# Patient Record
Sex: FEMALE | Race: BLACK OR AFRICAN AMERICAN | NOT HISPANIC OR LATINO | Employment: UNEMPLOYED | URBAN - METROPOLITAN AREA
[De-identification: names, ages, dates, MRNs, and addresses within clinical notes are randomized per-mention and may not be internally consistent; named-entity substitution may affect disease eponyms.]

---

## 2019-10-31 ENCOUNTER — RECORDS - HEALTHEAST (OUTPATIENT)
Dept: LAB | Facility: CLINIC | Age: 39
End: 2019-10-31

## 2019-10-31 ENCOUNTER — RECORDS - HEALTHEAST (OUTPATIENT)
Dept: ADMINISTRATIVE | Facility: OTHER | Age: 39
End: 2019-10-31

## 2019-10-31 LAB
ANION GAP SERPL CALCULATED.3IONS-SCNC: 9 MMOL/L (ref 5–18)
BUN SERPL-MCNC: 12 MG/DL (ref 8–22)
CALCIUM SERPL-MCNC: 9 MG/DL (ref 8.5–10.5)
CHLORIDE BLD-SCNC: 106 MMOL/L (ref 98–107)
CHOLEST SERPL-MCNC: 196 MG/DL
CLUE CELLS: NORMAL
CO2 SERPL-SCNC: 24 MMOL/L (ref 22–31)
CREAT SERPL-MCNC: 0.74 MG/DL (ref 0.6–1.1)
FASTING STATUS PATIENT QL REPORTED: ABNORMAL
GFR SERPL CREATININE-BSD FRML MDRD: >60 ML/MIN/1.73M2
GLUCOSE BLD-MCNC: 80 MG/DL (ref 70–125)
HDLC SERPL-MCNC: 36 MG/DL
LDLC SERPL CALC-MCNC: 135 MG/DL
POTASSIUM BLD-SCNC: 4 MMOL/L (ref 3.5–5)
SODIUM SERPL-SCNC: 139 MMOL/L (ref 136–145)
TRICHOMONAS, WET PREP: NORMAL
TRIGL SERPL-MCNC: 127 MG/DL
TSH SERPL DL<=0.005 MIU/L-ACNC: 0.35 UIU/ML (ref 0.3–5)
YEAST, WET PREP: NORMAL

## 2019-11-01 LAB
C TRACH DNA SPEC QL PROBE+SIG AMP: NEGATIVE
HPV SOURCE: NORMAL
HUMAN PAPILLOMA VIRUS 16 DNA: NEGATIVE
HUMAN PAPILLOMA VIRUS 18 DNA: NEGATIVE
HUMAN PAPILLOMA VIRUS FINAL DIAGNOSIS: NORMAL
HUMAN PAPILLOMA VIRUS OTHER HR: NEGATIVE
N GONORRHOEA DNA SPEC QL NAA+PROBE: NEGATIVE
SPECIMEN DESCRIPTION: NORMAL

## 2019-11-06 LAB

## 2020-07-14 ENCOUNTER — RECORDS - HEALTHEAST (OUTPATIENT)
Dept: ADMINISTRATIVE | Facility: OTHER | Age: 40
End: 2020-07-14

## 2020-07-20 ENCOUNTER — RECORDS - HEALTHEAST (OUTPATIENT)
Dept: ADMINISTRATIVE | Facility: OTHER | Age: 40
End: 2020-07-20

## 2020-07-22 ENCOUNTER — COMMUNICATION - HEALTHEAST (OUTPATIENT)
Dept: ADMINISTRATIVE | Facility: CLINIC | Age: 40
End: 2020-07-22

## 2020-07-27 ENCOUNTER — COMMUNICATION - HEALTHEAST (OUTPATIENT)
Dept: RHEUMATOLOGY | Facility: CLINIC | Age: 40
End: 2020-07-27

## 2020-07-27 ENCOUNTER — OFFICE VISIT - HEALTHEAST (OUTPATIENT)
Dept: RHEUMATOLOGY | Facility: CLINIC | Age: 40
End: 2020-07-27

## 2020-07-27 DIAGNOSIS — M25.562 CHRONIC PAIN OF BOTH KNEES: ICD-10-CM

## 2020-07-27 DIAGNOSIS — E66.3 OVERWEIGHT: ICD-10-CM

## 2020-07-27 DIAGNOSIS — M79.18 MYOFASCIAL PAIN: ICD-10-CM

## 2020-07-27 DIAGNOSIS — M79.642 PAIN IN BOTH HANDS: ICD-10-CM

## 2020-07-27 DIAGNOSIS — G89.29 CHRONIC PAIN OF BOTH KNEES: ICD-10-CM

## 2020-07-27 DIAGNOSIS — M25.561 CHRONIC PAIN OF BOTH KNEES: ICD-10-CM

## 2020-07-27 DIAGNOSIS — M79.641 PAIN IN BOTH HANDS: ICD-10-CM

## 2020-07-27 RX ORDER — ALBUTEROL SULFATE 0.83 MG/ML
SOLUTION RESPIRATORY (INHALATION)
Status: SHIPPED | COMMUNITY
Start: 2020-03-17

## 2020-07-27 RX ORDER — CELECOXIB 100 MG/1
100 CAPSULE ORAL 2 TIMES DAILY
Status: SHIPPED | COMMUNITY
Start: 2020-07-06 | End: 2022-06-16

## 2020-07-27 RX ORDER — ALBUTEROL SULFATE 90 UG/1
AEROSOL, METERED RESPIRATORY (INHALATION)
Status: SHIPPED | COMMUNITY
Start: 2020-07-27 | End: 2022-01-06

## 2020-08-07 ENCOUNTER — HOSPITAL ENCOUNTER (OUTPATIENT)
Dept: RADIOLOGY | Facility: HOSPITAL | Age: 40
Discharge: HOME | End: 2020-08-07
Attending: INTERNAL MEDICINE

## 2020-08-07 DIAGNOSIS — M25.561 CHRONIC PAIN OF BOTH KNEES: ICD-10-CM

## 2020-08-07 DIAGNOSIS — M79.641 PAIN IN BOTH HANDS: ICD-10-CM

## 2020-08-07 DIAGNOSIS — G89.29 CHRONIC PAIN OF BOTH KNEES: ICD-10-CM

## 2020-08-07 DIAGNOSIS — M25.562 CHRONIC PAIN OF BOTH KNEES: ICD-10-CM

## 2020-08-07 DIAGNOSIS — M79.642 PAIN IN BOTH HANDS: ICD-10-CM

## 2020-08-11 ENCOUNTER — COMMUNICATION - HEALTHEAST (OUTPATIENT)
Dept: RHEUMATOLOGY | Facility: CLINIC | Age: 40
End: 2020-08-11

## 2020-09-01 ENCOUNTER — OFFICE VISIT (OUTPATIENT)
Dept: NEUROLOGY | Facility: CLINIC | Age: 40
End: 2020-09-01
Payer: COMMERCIAL

## 2020-09-01 DIAGNOSIS — M79.641 PAIN IN BOTH HANDS: Primary | ICD-10-CM

## 2020-09-01 DIAGNOSIS — M79.642 PAIN IN BOTH HANDS: Primary | ICD-10-CM

## 2020-09-01 DIAGNOSIS — G56.02 CARPAL TUNNEL SYNDROME OF LEFT WRIST: ICD-10-CM

## 2020-09-01 PROCEDURE — 95910 NRV CNDJ TEST 7-8 STUDIES: CPT | Performed by: PSYCHIATRY & NEUROLOGY

## 2020-09-01 PROCEDURE — 95886 MUSC TEST DONE W/N TEST COMP: CPT | Mod: RT | Performed by: PSYCHIATRY & NEUROLOGY

## 2020-09-01 NOTE — LETTER
9/1/2020         RE: Jannie Sanchez  [address]  [facility] [address]  [facility] [address]        Dear Colleague,    Thank you for referring your patient, Jannie Sanchez, to the [facility] [place]. Please see a copy of my visit note below.    EMG performed.  Margarito Narayanan MD    Again, thank you for allowing me to participate in the care of your patient.        Sincerely,        Margarito Narayanan MD, MD

## 2020-09-01 NOTE — PROCEDURES
[facility]    [facility]Former[facility], [address]  [place] [address]  Tel: [phone]  Fax: [phone]          Full Name: Jannie Sanchez Gender: Female  Patient ID: [id number] YOB: 1980      Visit Date: 9/1/2020 11:01  Age: 39 Years 9 Months Old  Interpreted By: Margarito Narayanan MD   Ref Dr.: Kayli Rod PA  Tech:    EDX Exam: EMG   Height: 5 feet 6 inch  Reason for referral: Evaluate bilateral uppers. c/o weakness in both hands > 2 years. Right > Left.       Motor NCS      Nerve / Sites Lat Amp Dist Enrico    ms mV cm m/s   R Median - APB      Wrist 3.33 18.4 7       Elbow 7.40 17.9 24 59   L Median - APB      Wrist 3.70 11.6 7       Elbow 7.66 11.1 22 56   R Ulnar - ADM      Wrist 2.71 9.6 7       B.Elbow 6.30 9.7 23 64      A.Elbow 7.76 9.7 10 69       F  Wave      Nerve Fmin    ms   R Ulnar - ADM 25.99       Sensory NCS      Nerve / Sites Onset Lat Pk Lat PP Amp Dist    ms ms  V cm   R Median - II (Antidr)      Wrist 2.60 3.13 54.5 13   L Median - II (Antidr)      Wrist 2.81 3.39 57.7 13   R Ulnar - V (Antidr)      Wrist 1.98 2.60 48.5 11   R Median, Ulnar - Transcarpal comparison      Median Palm 1.67 2.24 33.0 8      Ulnar Palm 1.35 1.93 33.4 8   L Median, Ulnar - Transcarpal comparison      Median Palm 1.77 2.40 55.3 8      Ulnar Palm 1.25 1.82 38.2 8       EMG Summary Table     Spontaneous MUAP Rcmt Note   Muscle Fib PSW Fasc IA # Amp Dur PPP Rate Type   R. Brachioradialis None None None N N N N N N N   R. Pronator teres None None None N N N N N N N   R. Biceps brachii None None None N N N N N N N   R. Deltoid None None None N N N N N N N   R. Triceps brachii None None None N N N N N N N   R. Flexor carpi ulnaris None None None N N N N N N N   R. First dorsal interosseous None None None N N N N N N N   R. Abductor pollicis brevis None None None N N N N N N N   L. Brachioradialis None None None N N N N N N N    L. Pronator teres None None None N N N N N N N   L. Biceps brachii None None None N N N N N N N   L. Deltoid None None None N N N N N N N   L. Triceps brachii None None None N N N N N N N   L. First dorsal interosseous None None None N N N N N N N   L. Abductor pollicis brevis None None None N N N N N N N     EMG report  Patient name: Jannie Sanchez  YOB: 1980  Date of service: 9/1/2020  Medical record number: [id number]  Referring provider: Kayli Rod PA-C  EMG interpreted by: Margarito Narayanan MD    REASON FOR STUDY: 39-year-old female with pain and weakness of both hands for more than 2 years time,  worse on the right as compared to the left.    SENSORY NCS: Bilateral median and right ulnar sensory nerve conduction studies demonstrate normal distal latencies and amplitudes.  Left median-ulnar palmar study demonstrates prolongation of the left median palmar response as compared to the left ulnar palmar response.  Right median-ulnar palmar study demonstrates no significant latency difference.    MOTOR NCS: Bilateral median and right ulnar motor nerve conduction studies demonstrate normal distal latencies, amplitudes and nerve conduction velocities.  Right ulnar F wave is within normal limits.    NEEDLE EXAMINATION: Needle examination of selected muscles of both upper extremities was performed.  No spontaneous activity was seen.  Motor units were of normal amplitude, duration and recruitment.    IMPRESSION: 1.  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    Margarito Narayanan MD

## 2020-09-01 NOTE — LETTER
Pre-procedure Diagnoses    1. Pain in both hands [M79.641, M79.642]    Post-procedure Diagnoses    1. Pain in both hands [M79.641, M79.642]    2. Carpal tunnel syndrome of left wrist [G56.02]    Procedures    1. EMG [NEU5 (Custom)]           [facility]     ([facility]) [facility] [address], [address]  [place] [address]  Tel: [phone]  Fax: [phone]            Full Name:    Jannie Sanchez                          Gender:             Female  Patient ID:    [id number]                                         YOB: 1980      Visit Date:                      9/1/2020 11:01  Age:                               39 Years 9 Months Old  Interpreted By:              Margarito Narayanan MD   Ref Dr.:                          Kayli SMITH  Tech:                              ST   EDX Exam:                    EMG   Height:                           5 feet 6 inch  Reason for referral:      Evaluate bilateral uppers. c/o weakness in both hands > 2 years. Right > Left.       Motor NCS      Nerve / Sites Lat Amp Dist Enrico     ms mV cm m/s   R Median - APB      Wrist 3.33 18.4 7        Elbow 7.40 17.9 24 59   L Median - APB      Wrist 3.70 11.6 7        Elbow 7.66 11.1 22 56   R Ulnar - ADM      Wrist 2.71 9.6 7        B.Elbow 6.30 9.7 23 64      A.Elbow 7.76 9.7 10 69       F  Wave      Nerve Fmin     ms   R Ulnar - ADM 25.99       Sensory NCS      Nerve / Sites Onset Lat Pk Lat PP Amp Dist     ms ms  V cm   R Median - II (Antidr)      Wrist 2.60 3.13 54.5 13   L Median - II (Antidr)      Wrist 2.81 3.39 57.7 13   R Ulnar - V (Antidr)      Wrist 1.98 2.60 48.5 11   R Median, Ulnar - Transcarpal comparison      Median Palm 1.67 2.24 33.0 8      Ulnar Palm 1.35 1.93 33.4 8   L Median, Ulnar - Transcarpal comparison      Median Palm 1.77 2.40 55.3 8      Ulnar Palm 1.25 1.82 38.2 8                    EMG Summary Table       Spontaneous MUAP Rcmt  Note   Muscle Fib PSW Fasc IA # Amp Dur PPP Rate Type   R. Brachioradialis None None None N N N N N N N   R. Pronator teres None None None N N N N N N N   R. Biceps brachii None None None N N N N N N N   R. Deltoid None None None N N N N N N N   R. Triceps brachii None None None N N N N N N N   R. Flexor carpi ulnaris None None None N N N N N N N   R. First dorsal interosseous None None None N N N N N N N   R. Abductor pollicis brevis None None None N N N N N N N   L. Brachioradialis None None None N N N N N N N   L. Pronator teres None None None N N N N N N N   L. Biceps brachii None None None N N N N N N N   L. Deltoid None None None N N N N N N N   L. Triceps brachii None None None N N N N N N N   L. First dorsal interosseous None None None N N N N N N N   L. Abductor pollicis brevis None None None N N N N N N N      EMG report  Patient name: Jannie Sanchez  YOB: 1980  Date of service: 9/1/2020  Medical record number: [id number]  Referring provider: Kayli Rod PA-C  EMG interpreted by: Margarito Narayanan MD     REASON FOR STUDY: 39-year-old female with pain and weakness of both hands for more than 2 years time,  worse on the right as compared to the left.     SENSORY NCS: Bilateral median and right ulnar sensory nerve conduction studies demonstrate normal distal latencies and amplitudes.  Left median-ulnar palmar study demonstrates prolongation of the left median palmar response as compared to the left ulnar palmar response.  Right median-ulnar palmar study demonstrates no significant latency difference.     MOTOR NCS: Bilateral median and right ulnar motor nerve conduction studies demonstrate normal distal latencies, amplitudes and nerve conduction velocities.  Right ulnar F wave is within normal limits.     NEEDLE EXAMINATION: Needle examination of selected muscles of both upper extremities was performed.  No spontaneous activity was seen.  Motor units were of normal amplitude, duration  and recruitment.     IMPRESSION: 1.  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     Margarito Narayanan MD

## 2021-02-01 ENCOUNTER — OFFICE VISIT - HEALTHEAST (OUTPATIENT)
Dept: RHEUMATOLOGY | Facility: CLINIC | Age: 41
End: 2021-02-01

## 2021-02-01 ENCOUNTER — COMMUNICATION - HEALTHEAST (OUTPATIENT)
Dept: RHEUMATOLOGY | Facility: CLINIC | Age: 41
End: 2021-02-01

## 2021-02-01 DIAGNOSIS — M54.2 NECK PAIN: ICD-10-CM

## 2021-02-01 DIAGNOSIS — G89.29 CHRONIC PAIN OF BOTH KNEES: ICD-10-CM

## 2021-02-01 DIAGNOSIS — E83.51 LOW CALCIUM LEVELS: ICD-10-CM

## 2021-02-01 DIAGNOSIS — M25.561 CHRONIC PAIN OF BOTH KNEES: ICD-10-CM

## 2021-02-01 DIAGNOSIS — M79.18 MYOFASCIAL PAIN: ICD-10-CM

## 2021-02-01 DIAGNOSIS — E66.01 MORBID OBESITY (H): ICD-10-CM

## 2021-02-01 DIAGNOSIS — M79.642 PAIN IN BOTH HANDS: ICD-10-CM

## 2021-02-01 DIAGNOSIS — M25.562 CHRONIC PAIN OF BOTH KNEES: ICD-10-CM

## 2021-02-01 DIAGNOSIS — M79.641 PAIN IN BOTH HANDS: ICD-10-CM

## 2021-02-01 RX ORDER — MOMETASONE FUROATE AND FORMOTEROL FUMARATE DIHYDRATE 100; 5 UG/1; UG/1
2 AEROSOL RESPIRATORY (INHALATION) 2 TIMES DAILY
Status: SHIPPED | COMMUNITY
Start: 2021-01-21

## 2021-02-01 RX ORDER — TIZANIDINE 2 MG/1
TABLET ORAL
Qty: 120 TABLET | Refills: 2 | Status: SHIPPED | OUTPATIENT
Start: 2021-02-01

## 2021-04-07 ENCOUNTER — AMBULATORY - HEALTHEAST (OUTPATIENT)
Dept: LAB | Facility: CLINIC | Age: 41
End: 2021-04-07

## 2021-04-07 DIAGNOSIS — E83.51 LOW CALCIUM LEVELS: ICD-10-CM

## 2021-04-07 DIAGNOSIS — M79.18 MYOFASCIAL PAIN: ICD-10-CM

## 2021-04-07 DIAGNOSIS — M79.641 PAIN IN BOTH HANDS: ICD-10-CM

## 2021-04-07 DIAGNOSIS — M79.642 PAIN IN BOTH HANDS: ICD-10-CM

## 2021-04-07 LAB
ALBUMIN SERPL-MCNC: 3.6 G/DL (ref 3.5–5)
AST SERPL W P-5'-P-CCNC: 10 U/L (ref 0–40)
C REACTIVE PROTEIN LHE: 0.2 MG/DL (ref 0–0.8)
CALCIUM SERPL-MCNC: 8.7 MG/DL (ref 8.5–10.5)
CK SERPL-CCNC: 126 U/L (ref 30–190)
ERYTHROCYTE [SEDIMENTATION RATE] IN BLOOD BY WESTERGREN METHOD: 23 MM/HR (ref 0–20)
RHEUMATOID FACT SERPL-ACNC: <15 IU/ML (ref 0–30)
TSH SERPL DL<=0.005 MIU/L-ACNC: 0.43 UIU/ML (ref 0.3–5)

## 2021-04-08 LAB
25(OH)D3 SERPL-MCNC: 10 NG/ML (ref 30–80)
ALT SERPL W P-5'-P-CCNC: <9 U/L (ref 0–45)
CCP AB SER IA-ACNC: 0.7 U/ML

## 2021-04-09 LAB — ANA SER QL: 9.3 U

## 2021-04-13 LAB
DNA (DS) ANTIBODY - HISTORICAL: 8 IU
JO-1 AUTOANTIBODIES - HISTORICAL: 0 EU
SCL-70 AUTOANTIBODIES - HISTORICAL: 0 EU
SM (SMITH AUTOANTIBODIES - HISTORICAL: 2 EU
SM/RNP AUTOANTIBODIES - HISTORICAL: 157 EU
SS-A/RO AUTOANTIBODIES - HISTORICAL: 1 EU
SS-B/LA AUTOANTIBODIES - HISTORICAL: 1 EU

## 2021-05-16 ENCOUNTER — COMMUNICATION - HEALTHEAST (OUTPATIENT)
Dept: RHEUMATOLOGY | Facility: CLINIC | Age: 41
End: 2021-05-16

## 2021-05-16 DIAGNOSIS — E55.9 VITAMIN D DEFICIENCY: ICD-10-CM

## 2021-05-16 DIAGNOSIS — R76.8 POSITIVE ANA (ANTINUCLEAR ANTIBODY): ICD-10-CM

## 2021-05-19 RX ORDER — ERGOCALCIFEROL 1.25 MG/1
CAPSULE ORAL
Qty: 16 CAPSULE | Refills: 0 | Status: SHIPPED | OUTPATIENT
Start: 2021-05-19

## 2021-05-27 ENCOUNTER — RECORDS - HEALTHEAST (OUTPATIENT)
Dept: ADMINISTRATIVE | Facility: CLINIC | Age: 41
End: 2021-05-27

## 2021-05-28 ENCOUNTER — RECORDS - HEALTHEAST (OUTPATIENT)
Dept: ADMINISTRATIVE | Facility: CLINIC | Age: 41
End: 2021-05-28

## 2021-05-29 ENCOUNTER — RECORDS - HEALTHEAST (OUTPATIENT)
Dept: ADMINISTRATIVE | Facility: CLINIC | Age: 41
End: 2021-05-29

## 2021-05-30 ENCOUNTER — RECORDS - HEALTHEAST (OUTPATIENT)
Dept: ADMINISTRATIVE | Facility: CLINIC | Age: 41
End: 2021-05-30

## 2021-05-31 ENCOUNTER — RECORDS - HEALTHEAST (OUTPATIENT)
Dept: ADMINISTRATIVE | Facility: CLINIC | Age: 41
End: 2021-05-31

## 2021-06-01 ENCOUNTER — RECORDS - HEALTHEAST (OUTPATIENT)
Dept: ADMINISTRATIVE | Facility: CLINIC | Age: 41
End: 2021-06-01

## 2021-06-02 ENCOUNTER — RECORDS - HEALTHEAST (OUTPATIENT)
Dept: ADMINISTRATIVE | Facility: CLINIC | Age: 41
End: 2021-06-02

## 2021-06-09 NOTE — TELEPHONE ENCOUNTER
[facility] -   Referring: Kayli Rod  DX: Polyarthralgia  Records received  7/20/2020 10:04am inside Rheum consult Fax

## 2021-06-09 NOTE — TELEPHONE ENCOUNTER
Date: 7/27/2020 Status: [facility]   Time: 11:00 AM Length: 60   Visit Type: VIDEO VISIT NEW [1881] Copay: $0.00   Provider: Clint Pearson DO

## 2021-06-10 NOTE — PATIENT INSTRUCTIONS - HE
Summary of Your Rheumatology Visit    Next Appointment:  2 Months    Medications:     Please follow directives on pill bottle on how to take medication(s) provided.    Please inquire from your gastroenterologist if okay to take Tylenol 500-1000 mg once or twice a day on a when necessary basis as an alternative to Celebrex or in between Celebrex dosing.    After having labs performed you can contact us inquiring if okay to increase Celebrex from 200 mg daily to 200 mg twice a day as needed.  Celebrex always needs to be taken with food.    Referrals:    Occupational Therapy    Tests:      Please have labs and x-rays that were ordered performed.        Injections:         Other:

## 2021-06-10 NOTE — PROGRESS NOTES
Jannielillian Sanchez who presents today with a chief complaint of  Consult      Joint Pains: Yes  Location: hands and knees   Onset: Chronic  Intensity:  8/10  AM Stiffness: 30 Minutes  Alleviating/Aggravating Factors: Medications helpful yes   Tolerating Meds: Yes  Other:      ROS:  Patient denies having: +persistent dry eyes, +dry mouth, recurrent oral ulcers, patchy alopecia, active rashes, photosensitivity, history of psoriasis, active chest pain, active shortness of breath, active cough, active dysuria, history of kidney stones, active abdominal pain, active diarrhea, history of hematochezia, active dysphagia, history of peptic ulcer disease, history of HIV, tuberculosis, hepatitis B or C, Lyme disease, seizure history, raynaud's, active documented fevers, recent infections, difficulty sleeping or chronic unrefreshing sleep, involuntary weight loss, loss of appetite, +excessive fatigue, depression, anxiety,  recurrent sinus infections, history of inflammatory eye diseases (such as uveitis, scleritis, iritis, etc).     Information gathered by medical assistant incorporated into this note, was reviewed and discussed with the patient.    Problem List:  There is no problem list on file for this patient.       PMH:   Past Medical History:   Diagnosis Date     Asthma        Surgical History:  Past Surgical History:   Procedure Laterality Date     TUBAL LIGATION  2005       Family History:  No family history on file.    Social History:   reports that she has been smoking. She does not have any smokeless tobacco history on file. Drink 2 a month. Single has 3 children    Allergies:  Allergies   Allergen Reactions     Bee Venom Protein (Honey Bee)      Venom-Honey Bee Unknown        Current Medications:  Current Outpatient Medications   Medication Sig Dispense Refill     albuterol (PROAIR HFA;PROVENTIL HFA;VENTOLIN HFA) 90 mcg/actuation inhaler 2 puff(s)       albuterol (PROVENTIL HFA) 90 mcg/actuation inhaler Inhale 2  "puffs every 4 (four) hours as needed for wheezing. 1 Inhaler 0     albuterol (PROVENTIL) 2.5 mg /3 mL (0.083 %) nebulizer solution NEBULIZE 1 VIAL QID PRN       benzonatate (TESSALON) 100 MG capsule Take 1 capsule (100 mg total) by mouth every 8 (eight) hours. 21 capsule 0     celecoxib (CELEBREX) 100 MG capsule Take 100 mg by mouth 2 (two) times a day.       EPINEPHrine (EPIPEN) 0.3 mg/0.3 mL atIn Inject 0.3 mL (0.3 mg total) into the shoulder, thigh, or buttocks as needed. 1 Pre-filled Pen Syringe 0     HYDROcodone-acetaminophen 5-325 mg per tablet Take 1 tablet by mouth every 4 (four) hours as needed for pain. 20 tablet 0     oxyCODONE-acetaminophen (PERCOCET/ENDOCET) 5-325 mg per tablet Take 1 tablet by mouth every 4 (four) hours as needed for pain. 12 tablet 0     No current facility-administered medications for this visit.            Physical Exam:  There were no vitals taken for this visit.  General: A & O x 3 in JUAN JOSE Sanchez is a 39 y.o. female who is being evaluated via a billable video visit.      The patient has been notified of following:     \"This video visit will be conducted via a call between you and your physician/provider. We have found that certain health care needs can be provided without the need for an in-person physical exam.  This service lets us provide the care you need with a video conversation.  If a prescription is necessary we can send it directly to your pharmacy.  If lab work is needed we can place an order for that and you can then stop by our lab to have the test done at a later time.    Video visits are billed at different rates depending on your insurance coverage. Please reach out to your insurance provider with any questions.    If during the course of the call the physician/provider feels a video visit is not appropriate, you will not be charged for this service.\"    Patient has given verbal consent to a Video visit? Yes  How would you like to obtain your AVS? AVS " Preference: MyChart.  If dropped by the video visit, the video invitation should be sent to: Text to cell phone: [phone]  Will anyone else be joining your video visit? No        Video Start Time: 11:10 AM    Additional provider notes:       Video-Visit Details    Type of service:  Video Visit    Video End Time (time video stopped): 11:46 AM  Originating Location (pt. Location): Home    Distant Location (provider location):  [facility] RHEUMATOLOGY     Platform used for Video Visit: Hever Pearson DO        Summary/Assessment:    Pleasant 39-year-old female presents with chronic arthralgias.    Joints involved include hands and knees.  On video exam points to whole left second digit and right PIP joints, sometimes right third MCP joint, unable to detect clearly synovitis on video exam.      Patient denies having any joint swelling.     Onset of pain, several years.  States for started noticing hand pains at 17 years old.    Tried Celebrex 200 mg daily which provided some relief when first started about 6 months ago however lately less effective.  Celebrex was well-tolerated.    States has not tried Tylenol due to chronic GI distress (history of constipation, bloating) however was advised can take Aleve or Advil.  Upon inquiring patient is unsure if she reversed directives.  Did see GI in the past, [facility] PCP who provided above suggesting is no longer accessible.    Patient admits to being overweight, is 5 foot 6 and weighs 230 pounds.    Denies receiving cortisone injections.    Admits to having chronic fatigue, nonrestorative sleep and myalgias.  Perhaps has component of myofascial pains contributing.    Given overweight body habitus degenerative joint disease in the differential particular involving weightbearing joints.  Uses hands a lot, provides example likes to braid hair.    Currently not taking any vitamin D.    States currently not taking narcotic medications and  would like to have these meds removed from med list, received when hospitalized in the past.    Please see below for management plan.    Pertinent rheumatology/past medical history (please refer to above for more detailed history):      Arthralgias (hands and knees)    Myalgias    Nonrestorative sleep    Chronic fatigue    Overweight    Tobacco use (smokes half a pack a day)    History of hysterectomy    Asthma      Rheumatology medications provided/suggested:    Flexeril  Celebrex      Pertinent medication from other providers or from otc (please refer to above for more detailed med list):          Pertinent medications already tried:     Aleve  Advil  Percocet      Pertinent lab history:          Pertinent imaging/test history:          Other:    Has 3 children, 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    Plan:      We will add Flexeril to act as a muscle relaxant hopefully improve her sleep.    Suggested having labs performed thereafter contacting us if okay to increase Celebrex from 200 mg daily to 200 mg twice a day, PRN.    Suggest that she clarify with her gastroenterologist if okay for her to take Tylenol as an alternative to Celebrex or in between Celebrex dosing, on a as needed basis, noted on AVS.  As patient may have misunderstood, stating clearance provided for Aleve and Advil.     Will refer to OT for hand pains and weakness.    Will obtain x-rays of: Hands and knees.    We will correlate lab results clinically.    Follow-up in 2 months.      Procedure note:         Spent 60 minutes with greater than half of this time spent with the patient going over differential diagnosis, prognosis, treatment plan, medication side effects and  answering questions.    Major side effect profile of medications provided/suggested were discussed with the patient.    This note was transcribed using Dragon voice recognition software as a result unintentional grammatical errors or word  substitutions may have occurred. Please contact our Rheumatology department if you need any clarification or if you have any related inquiries.    Thank you for referring this patient to our clinic.      Clint Pearson DO  ....................  7/27/2020   10:15 AM

## 2021-06-10 NOTE — TELEPHONE ENCOUNTER
lvm for patient to call the clinic back to schedule     Summary of Your Rheumatology Visit     Next Appointment:  2 Months     Medications:     Please follow directives on pill bottle on how to take medication(s) provided.     Please inquire from your gastroenterologist if okay to take Tylenol 500-1000 mg once or twice a day on a when necessary basis as an alternative to Celebrex or in between Celebrex dosing.     After having labs performed you can contact us inquiring if okay to increase Celebrex from 200 mg daily to 200 mg twice a day as needed.  Celebrex always needs to be taken with food.     Referrals:     Occupational Therapy     Tests:      Please have labs and x-rays that were ordered performed.        Injections:

## 2021-06-14 NOTE — PATIENT INSTRUCTIONS - HE
Summary of Your Rheumatology Visit    Next Appointment:   8-10 weeks      Medications:    Please follow directives on pill bottle on how to take medication(s) provided.    Hold Flexeril.    Referrals:    Occupational Therapy    Tests:     Please have labs performed in about 6 weeks.       Injections:        Other:

## 2021-06-15 ENCOUNTER — AMBULATORY - HEALTHEAST (OUTPATIENT)
Dept: LAB | Facility: CLINIC | Age: 41
End: 2021-06-15

## 2021-06-15 DIAGNOSIS — R76.8 POSITIVE ANA (ANTINUCLEAR ANTIBODY): ICD-10-CM

## 2021-06-15 DIAGNOSIS — E55.9 VITAMIN D DEFICIENCY: ICD-10-CM

## 2021-06-15 LAB
ALBUMIN UR-MCNC: NEGATIVE G/DL
APPEARANCE UR: CLEAR
BACTERIA #/AREA URNS HPF: ABNORMAL /[HPF]
BASOPHILS # BLD AUTO: 0 THOU/UL (ref 0–0.2)
BASOPHILS NFR BLD AUTO: 0 % (ref 0–2)
BILIRUB UR QL STRIP: NEGATIVE
COLOR UR AUTO: YELLOW
CREAT SERPL-MCNC: 0.8 MG/DL (ref 0.6–1.1)
CREAT UR-MCNC: 238.7 MG/DL
EOSINOPHIL # BLD AUTO: 0 THOU/UL (ref 0–0.4)
EOSINOPHIL NFR BLD AUTO: 0 % (ref 0–6)
ERYTHROCYTE [DISTWIDTH] IN BLOOD BY AUTOMATED COUNT: 14.9 % (ref 11–14.5)
GFR SERPL CREATININE-BSD FRML MDRD: >60 ML/MIN/1.73M2
GLUCOSE UR STRIP-MCNC: NEGATIVE MG/DL
HCT VFR BLD AUTO: 36.7 % (ref 35–47)
HGB BLD-MCNC: 12.1 G/DL (ref 12–16)
HGB UR QL STRIP: ABNORMAL
IMM GRANULOCYTES # BLD: 0 THOU/UL
IMM GRANULOCYTES NFR BLD: 0 %
KETONES UR STRIP-MCNC: NEGATIVE MG/DL
LEUKOCYTE ESTERASE UR QL STRIP: NEGATIVE
LYMPHOCYTES # BLD AUTO: 1.7 THOU/UL (ref 0.8–4.4)
LYMPHOCYTES NFR BLD AUTO: 37 % (ref 20–40)
MCH RBC QN AUTO: 30.4 PG (ref 27–34)
MCHC RBC AUTO-ENTMCNC: 33 G/DL (ref 32–36)
MCV RBC AUTO: 92 FL (ref 80–100)
MICROALBUMIN UR-MCNC: 1.53 MG/DL (ref 0–1.99)
MICROALBUMIN/CREAT UR: 6.4 MG/G
MONOCYTES # BLD AUTO: 0.4 THOU/UL (ref 0–0.9)
MONOCYTES NFR BLD AUTO: 8 % (ref 2–10)
NEUTROPHILS # BLD AUTO: 2.5 THOU/UL (ref 2–7.7)
NEUTROPHILS NFR BLD AUTO: 55 % (ref 50–70)
NITRATE UR QL: NEGATIVE
PH UR STRIP: 6 [PH] (ref 5–8)
PLATELET # BLD AUTO: 162 THOU/UL (ref 140–440)
PMV BLD AUTO: 9.1 FL (ref 7–10)
RBC # BLD AUTO: 3.98 MILL/UL (ref 3.8–5.4)
RBC #/AREA URNS AUTO: ABNORMAL HPF
SP GR UR STRIP: 1.02 (ref 1–1.03)
SQUAMOUS #/AREA URNS AUTO: ABNORMAL LPF
UROBILINOGEN UR STRIP-ACNC: ABNORMAL
WBC #/AREA URNS AUTO: ABNORMAL HPF
WBC: 4.6 THOU/UL (ref 4–11)

## 2021-06-16 PROBLEM — E66.01 MORBID OBESITY (H): Status: ACTIVE | Noted: 2021-02-01

## 2021-06-16 LAB — 25(OH)D3 SERPL-MCNC: 27.1 NG/ML (ref 30–80)

## 2021-06-17 LAB
CARDIOLIPIN IGA SER IA-ACNC: 0.6 APL U/ML (ref 0–19.9)
CARDIOLIPIN IGG SER IA-ACNC: <1.6 GPL-U/ML (ref 0–19.9)
CARDIOLIPIN IGM SER IA-ACNC: <0.2 MPL-U/ML (ref 0–19.9)

## 2021-06-17 NOTE — TELEPHONE ENCOUNTER
Pt notified of lab results. Lab orders entered and lab appt scheduled 6/15/21. Vitamin D rx sent to pharmacy.

## 2021-06-17 NOTE — TELEPHONE ENCOUNTER
"Left message for pt to call back    \"Noted to have positive [place] and  Sm/RNP antibodies, sometimes these results can be associated with a condition called mixed connective tissue disease (which is a mix of signs/symptoms from a few connective tissue diseases).  These lab test results need to be correlated clinically.       Vitamin D level was noted to be low, recommend replenishing with 50,000 units Monday and Thursday for 4 weeks thereafter once a week for 8 weeks for total of 16 doses thereafter recheck level.  If taking over-the-counter vitamin D supplements, recommend holding over-the-counter dose for now until completed above replenishing dose.     CRP (C-reactive protein), a nonspecific inflammatory marker, was within normal limits.  Other similar nonspecific inflammatory marker called ESR (sedimentation rate) was fine when corrected for age/gender.  These levels need to be correlated clinically.     Otherwise remainder of lab results were within normal limits.     Given positive [place] recommend checking urine microalbumin, urinalysis (when not experiencing menstrual period), creatinine, CBC with differential, cardiolipin antibodies, C3/C4 and lupus anticoagulant.     Recommend follow-up patient be in clinic visit, please modify if necessary. Please place lab orders mentioned above.     We can discuss details of results more extensively on future visit. \"  "

## 2021-06-17 NOTE — TELEPHONE ENCOUNTER
Please see my comments associated with lab results from April 7, 2021 and discuss with the patient.

## 2021-06-18 LAB — LA PPP-IMP: NEGATIVE

## 2021-06-20 NOTE — LETTER
Letter by Clint Pearson DO at      Author: Clint Pearson DO Service: -- Author Type: --    Filed:  Encounter Date: 8/11/2020 Status: (Other)         Jannie Sanchez  [address]  [facility] [address]             August 11, 2020         Dear Ms. Sanchez,    Below are the results from your recent visit:    Resulted Orders   XR Knees Bilateral 1 Or 2 VWS    Narrative    EXAM: XR KNEES BILATERAL 1 OR 2 VWS  LOCATION: [facility]  DATE/TIME: 8/7/2020 4:30 PM    INDICATION: Pain in right knee  COMPARISON: Left knee MRI dated 02/01/2012.      Impression    RIGHT KNEE: Mild tricompartmental degenerative changes most pronounced in the patellofemoral compartment. No fracture or joint effusion.    LEFT KNEE: Tricompartmental degenerative changes which are mild to moderate in the medial and patellofemoral compartments. No fracture or joint effusion.      IMPRESSION:    RIGHT KNEE: Mild tricompartmental degenerative changes most pronounced in the patellofemoral compartment.  No fracture or joint effusion.        LEFT KNEE: Tricompartmental degenerative changes which are mild to moderate in the medial and patellofemoral  compartments. No fracture or joint effusion.      Degenerative findings mentioned are consistent with having signs of Osteoarthritis. Osteoarthritis represents  gradual wearing down with time of the protective cartilage in a joint.         Please call with questions or contact us using [email or web address].    Sincerely,        Electronically signed by Clint Pearson DO

## 2021-06-20 NOTE — LETTER
Letter by Clint Pearson DO at      Author: Clint Pearson DO Service: -- Author Type: --    Filed:  Encounter Date: 8/11/2020 Status: (Other)         Jannie Sanchez  [address]  [facility] [address]             August 11, 2020         Dear Ms. Schmidtugh,    Below are the results from your recent visit:    Resulted Orders   XR Hands Bilateral 3 or More VWS    Narrative    EXAM: XR HANDS BILATERAL 3 OR MORE VWS  LOCATION: [facility]  DATE/TIME: 8/7/2020 4:30 PM    INDICATION: Bilateral hand pain.  COMPARISON: None.      Impression    Normal joint spaces and alignment. No erosive change or evidence of an inflammatory arthropathy. No fracture.        IMPRESSION:    Normal joint spaces and alignment. No erosive change or evidence of an inflammatory arthropathy. No fracture.     Please call with questions or contact us using [employer].    Sincerely,        Electronically signed by Clint Pearson DO

## 2021-06-29 ENCOUNTER — HOSPITAL ENCOUNTER (OUTPATIENT)
Dept: RADIOLOGY | Facility: CLINIC | Age: 41
Discharge: HOME | End: 2021-06-29
Attending: INTERNAL MEDICINE
Payer: COMMERCIAL

## 2021-06-29 DIAGNOSIS — R06.02 SOB (SHORTNESS OF BREATH) ON EXERTION: ICD-10-CM

## 2021-06-29 DIAGNOSIS — R07.9 CHEST PAIN, UNSPECIFIED TYPE: ICD-10-CM

## 2021-06-29 DIAGNOSIS — R05.9 COUGH: ICD-10-CM

## 2021-07-01 ENCOUNTER — COMMUNICATION - HEALTHEAST (OUTPATIENT)
Dept: RHEUMATOLOGY | Facility: CLINIC | Age: 41
End: 2021-07-01

## 2021-07-01 ENCOUNTER — RECORDS - HEALTHEAST (OUTPATIENT)
Dept: RHEUMATOLOGY | Facility: CLINIC | Age: 41
End: 2021-07-01

## 2021-07-04 NOTE — TELEPHONE ENCOUNTER
Telephone Encounter by Nancy Kim RN at 7/1/2021 11:17 AM     Author: Nancy Kim RN Service: -- Author Type: Registered Nurse    Filed: 7/1/2021 11:17 AM Encounter Date: 7/1/2021 Status: Signed    : Nancy Kim RN (Registered Nurse)       ----- Message from Clint Pearson DO sent at 6/28/2021  6:31 PM CDT -----  Please mention to the patient:    Some improvement noted of vitamin D level, was 10 prior currently at 27.1 current level still a bit low hence, recommend replenishing with 50,000 units weekly for an additional 12 weeks thereafter recommend taking over-the-counter 1000 units daily.    Stable CBC/cell count.    Unremarkable urinalysis.    Remainder of lab results were within normal limits.    Please order medications mentioned above.

## 2021-07-04 NOTE — TELEPHONE ENCOUNTER
Telephone Encounter by Nancy Kim RN at 7/1/2021 11:17 AM     Author: Nancy Kim RN Service: -- Author Type: Registered Nurse    Filed: 7/1/2021 11:18 AM Encounter Date: 7/1/2021 Status: Signed    : Nancy Kim RN (Registered Nurse)       Tried calling pt, no answer voicemail box is full

## 2021-07-06 ENCOUNTER — COMMUNICATION - HEALTHEAST (OUTPATIENT)
Dept: RHEUMATOLOGY | Facility: CLINIC | Age: 41
End: 2021-07-06

## 2021-07-06 NOTE — TELEPHONE ENCOUNTER
Telephone Encounter by Berenice Conley at 7/6/2021 10:33 AM     Author: Berenice Conley Service: -- Author Type: --    Filed: 7/6/2021 10:34 AM Encounter Date: 7/1/2021 Status: Signed    : Berenice Conley       Patient will call clinic directly to schedule PFT

## 2021-07-07 NOTE — TELEPHONE ENCOUNTER
Telephone Encounter by Nancy Kim RN at 7/7/2021  4:08 PM     Author: Nancy Kim RN Service: -- Author Type: Registered Nurse    Filed: 7/7/2021  4:10 PM Encounter Date: 7/1/2021 Status: Signed    : Nancy Kim RN (Registered Nurse)       Pt notified of lab result comments, pt states she still has 7 weeks of vit D 50,000 unit pills left to take. Pt encouraged to finish the 7 pills that she has at home. Pt verbalized understanding

## 2021-07-22 NOTE — LETTER
Letter by Clint Pearson DO at      Author: Clint Pearson DO Service: -- Author Type: --    Filed:  Encounter Date: 7/6/2021 Status: (Other)         Jannie Sanchez  [address]  [facility] [address]             July 6, 2021         Dear Ms. Sanchez,    Below are the results from your recent visit:    Resulted Orders   XR Chest 2 Views    Narrative    EXAM: XR CHEST 2 VIEWS  LOCATION: [facility]  DATE/TIME: 6/29/2021 11:47 AM    INDICATION: Chest pain, unspecified  COMPARISON: 12/30/2020.      Impression    No evidence of pneumonia or pulmonary edema. Cardiomediastinal silhouette and pulmonary vasculature are within normal limits. No pneumothorax or pleural effusion.        IMPRESSION:    No evidence of pneumonia or pulmonary edema. Cardiomediastinal silhouette and pulmonary vasculature are  within normal limits. No pneumothorax or pleural effusion.     Please call with questions or contact us using [facility].    Sincerely,        Electronically signed by Clint Pearson DO

## 2021-07-23 ENCOUNTER — HOSPITAL ENCOUNTER (OUTPATIENT)
Dept: CARDIOLOGY | Facility: CLINIC | Age: 41
Discharge: HOME | End: 2021-07-23
Attending: INTERNAL MEDICINE | Admitting: INTERNAL MEDICINE
Payer: COMMERCIAL

## 2021-07-23 DIAGNOSIS — R07.9 CHEST PAIN, UNSPECIFIED: ICD-10-CM

## 2021-07-23 DIAGNOSIS — R06.02 SHORTNESS OF BREATH ON EXERTION: ICD-10-CM

## 2021-07-23 DIAGNOSIS — R76.8 ANTI-RNP ANTIBODIES PRESENT: ICD-10-CM

## 2021-07-23 DIAGNOSIS — R76.8 POSITIVE ANA (ANTINUCLEAR ANTIBODY): ICD-10-CM

## 2021-07-23 PROCEDURE — 93306 TTE W/DOPPLER COMPLETE: CPT

## 2021-07-23 PROCEDURE — 93306 TTE W/DOPPLER COMPLETE: CPT | Mod: 26 | Performed by: INTERNAL MEDICINE

## 2021-08-05 ENCOUNTER — HOSPITAL ENCOUNTER (OUTPATIENT)
Dept: RESPIRATORY THERAPY | Facility: CLINIC | Age: 41
Discharge: HOME | End: 2021-08-05
Attending: INTERNAL MEDICINE | Admitting: INTERNAL MEDICINE
Payer: COMMERCIAL

## 2021-08-05 DIAGNOSIS — R76.8 ANTI-RNP ANTIBODIES PRESENT: ICD-10-CM

## 2021-08-05 DIAGNOSIS — R05.9 COUGH: ICD-10-CM

## 2021-08-05 DIAGNOSIS — R76.8 POSITIVE ANA (ANTINUCLEAR ANTIBODY): ICD-10-CM

## 2021-08-05 DIAGNOSIS — R07.9 CHEST PAIN, UNSPECIFIED TYPE: ICD-10-CM

## 2021-08-05 DIAGNOSIS — R06.02 SOB (SHORTNESS OF BREATH) ON EXERTION: ICD-10-CM

## 2021-08-05 LAB — HGB BLD-MCNC: 11.4 G/DL (ref 11.7–15.7)

## 2021-08-05 PROCEDURE — 94729 DIFFUSING CAPACITY: CPT | Mod: 26 | Performed by: INTERNAL MEDICINE

## 2021-08-05 PROCEDURE — 94640 AIRWAY INHALATION TREATMENT: CPT

## 2021-08-05 PROCEDURE — 94060 EVALUATION OF WHEEZING: CPT

## 2021-08-05 PROCEDURE — 94729 DIFFUSING CAPACITY: CPT

## 2021-08-05 PROCEDURE — 94726 PLETHYSMOGRAPHY LUNG VOLUMES: CPT | Mod: 26 | Performed by: INTERNAL MEDICINE

## 2021-08-05 PROCEDURE — 999N000157 HC STATISTIC RCP TIME EA 10 MIN

## 2021-08-05 PROCEDURE — 36415 COLL VENOUS BLD VENIPUNCTURE: CPT | Performed by: INTERNAL MEDICINE

## 2021-08-05 PROCEDURE — 94060 EVALUATION OF WHEEZING: CPT | Mod: 26 | Performed by: INTERNAL MEDICINE

## 2021-08-05 PROCEDURE — 94726 PLETHYSMOGRAPHY LUNG VOLUMES: CPT

## 2021-08-05 PROCEDURE — 85018 HEMOGLOBIN: CPT | Performed by: INTERNAL MEDICINE

## 2021-08-05 RX ORDER — ALBUTEROL SULFATE 0.83 MG/ML
2.5 SOLUTION RESPIRATORY (INHALATION) ONCE
Status: DISCONTINUED | OUTPATIENT
Start: 2021-08-05 | End: 2021-08-06 | Stop reason: HOSPADM

## 2021-08-05 NOTE — LETTER
August 17, 2021      Jannie Sanchez  [address]   [facility] [address]        Dear ,    We are writing to inform you of your test results.        Resulted Orders   Hemoglobin   Result Value Ref Range    Hemoglobin 11.4 (L) 11.7 - 15.7 g/dL       Borderline low hemoglobin level consistent with having borderline anemia, not new/stable with   level fluctuating when trending over time, recommend periodic monitoring and discussing further with your primary care physician.     If you have any questions or concerns, please call the clinic at the number listed above.       Sincerely,      Clint Pearson, DO

## 2021-08-05 NOTE — PROGRESS NOTES
PFT NOTE  Pt gave good effort & was cooperative, was able to meet ATS standards for acceptability and repeatability. albuterol was given for the bronchodilator. Patient left in no distress.    Hyacinth Sotelo, RT

## 2021-08-05 NOTE — LETTER
August 30, 2021      Janniebrijesh Sanchez  [address]   [facility] [address]        Dear ,    We are writing to inform you of your test results.    Pulmonary function testing was unremarkable, below are details of summary results.     Impression:  Full Pulmonary Function Test is normal.  PFTs are consistent with no obstructive disease.     Spirometry is not consistent with reversibility.     There  is not  hyperinflation.     There  is not  air-trapping.     Diffusion capacity when corrected for hemoglobin is normal.       Resulted Orders   Pulmonary Function Test   Result Value Ref Range    FVC-Pred 3.61 L    FVC-Pre 2.62 L    FVC-%Pred-Pre 72 %    FEV1-Pre 2.18 L    FEV1-%Pred-Pre 73 %    FEV1FVC-Pred 83 %    FEV1FVC-Pre 83 %    FEFMax-Pred 7.27 L/sec    FEFMax-Pre 3.72 L/sec    FEFMax-%Pred-Pre 51 %    FEF2575-Pred 3.11 L/sec    FEF2575-Pre 2.37 L/sec    PZW0285-%Pred-Pre 76 %    FEF2575-Post 2.04 L/sec    IAE3975-%Pred-Post 65 %    ExpTime-Pre 6.02 sec    FIFMax-Pre 2.10 L/sec    VC-Pred 3.92 L    VC-Pre 2.67 L    VC-%Pred-Pre 67 %    IC-Pred 3.53 L    IC-Pre 2.44 L    IC-%Pred-Pre 69 %    ERV-Pred 0.40 L    ERV-Pre 0.23 L    ERV-%Pred-Pre 57 %    FEV1FEV6-Pred 84 %    FEV1FEV6-Pre 82 %    FRCPleth-Pred 2.80 L    FRCPleth-Pre 1.65 L    FRCPleth-%Pred-Pre 58 %    RVPleth-Pred 1.69 L    RVPleth-Pre 1.42 L    RVPleth-%Pred-Pre 84 %    TLCPleth-Pred 5.27 L    TLCPleth-Pre 4.09 L    TLCPleth-%Pred-Pre 77 %    DLCOunc-Pred 23.30 ml/min/mmHg    DLCOunc-Pre 18.82 ml/min/mmHg    DLCOunc-%Pred-Pre 80 %    DLCOcor-Pre 20.18 ml/min/mmHg    DLCOcor-%Pred-Pre 86 %    VA-Pre 3.74 L    VA-%Pred-Pre 69 %    FEV1SVC-Pred 76 %    FEV1SVC-Pre 82 %    Narrative    FEV1/FVC is 83% and is normal.    FEV1 is 2.18L (73%) predicted and is {normal.    FVC is 2.62L (72%) predicted and normal.    There  is not improvement in spirometry after a single inhaled dose of bronchodilator.    TLC is 4.09L (77%) predicted and is  normal.    RV is 1.42L (84%) predicted and is normal.    DLCO is 20.18ml/min/hg (86%) predicted and is normal when it is corrected for hemoglobin.    Flow volume loops indicate no abnormalities.        Impression:  Full Pulmonary Function Test is normal.  PFTs are consistent with no obstructive disease.    Spirometry is not consistent with reversibility.    There  is not  hyperinflation.    There  is not  air-trapping.    Diffusion capacity when corrected for hemoglobin is normal.        The ATS criteria for acceptability and reproducibility was met.        Malena Cruz    Pulmonary and Critical Care    [id number]                  This interpretation has been electronically signed:  Malena Cruz 08/23/2021  03:27:13 PM     Hemoglobin   Result Value Ref Range    Hemoglobin 11.4 (L) 11.7 - 15.7 g/dL       If you have any questions or concerns, please call the clinic at the number listed above.       Sincerely,      Clint Pearson, DO

## 2021-08-24 LAB
DLCOCOR-%PRED-PRE: 86 %
DLCOCOR-PRE: 20.18 ML/MIN/MMHG
DLCOUNC-%PRED-PRE: 80 %
DLCOUNC-PRE: 18.82 ML/MIN/MMHG
DLCOUNC-PRED: 23.3 ML/MIN/MMHG
ERV-%PRED-PRE: 57 %
ERV-PRE: 0.23 L
ERV-PRED: 0.4 L
EXPTIME-PRE: 6.02 SEC
FEF2575-%PRED-POST: 65 %
FEF2575-%PRED-PRE: 76 %
FEF2575-POST: 2.04 L/SEC
FEF2575-PRE: 2.37 L/SEC
FEF2575-PRED: 3.11 L/SEC
FEFMAX-%PRED-PRE: 51 %
FEFMAX-PRE: 3.72 L/SEC
FEFMAX-PRED: 7.27 L/SEC
FEV1-%PRED-PRE: 73 %
FEV1-PRE: 2.18 L
FEV1FEV6-PRE: 82 %
FEV1FEV6-PRED: 84 %
FEV1FVC-PRE: 83 %
FEV1FVC-PRED: 83 %
FEV1SVC-PRE: 82 %
FEV1SVC-PRED: 76 %
FIFMAX-PRE: 2.1 L/SEC
FRCPLETH-%PRED-PRE: 58 %
FRCPLETH-PRE: 1.65 L
FRCPLETH-PRED: 2.8 L
FVC-%PRED-PRE: 72 %
FVC-PRE: 2.62 L
FVC-PRED: 3.61 L
IC-%PRED-PRE: 69 %
IC-PRE: 2.44 L
IC-PRED: 3.53 L
RVPLETH-%PRED-PRE: 84 %
RVPLETH-PRE: 1.42 L
RVPLETH-PRED: 1.69 L
TLCPLETH-%PRED-PRE: 77 %
TLCPLETH-PRE: 4.09 L
TLCPLETH-PRED: 5.27 L
VA-%PRED-PRE: 69 %
VA-PRE: 3.74 L
VC-%PRED-PRE: 67 %
VC-PRE: 2.67 L
VC-PRED: 3.92 L

## 2021-12-06 ENCOUNTER — ALLIED HEALTH/NURSE VISIT (OUTPATIENT)
Dept: FAMILY MEDICINE | Facility: CLINIC | Age: 41
End: 2021-12-06
Payer: COMMERCIAL

## 2021-12-06 DIAGNOSIS — Z23 NEED FOR COVID-19 VACCINE: Primary | ICD-10-CM

## 2021-12-06 PROCEDURE — 0001A PR COVID VAC PFIZER DIL RECON 30 MCG/0.3 ML IM: CPT

## 2021-12-06 PROCEDURE — 99207 PR NO CHARGE NURSE ONLY: CPT

## 2021-12-06 PROCEDURE — 91300 PR COVID VAC PFIZER DIL RECON 30 MCG/0.3 ML IM: CPT

## 2022-01-06 ENCOUNTER — VIRTUAL VISIT (OUTPATIENT)
Dept: RHEUMATOLOGY | Facility: CLINIC | Age: 42
End: 2022-01-06
Payer: COMMERCIAL

## 2022-01-06 DIAGNOSIS — R76.8 POSITIVE ANA (ANTINUCLEAR ANTIBODY): Primary | ICD-10-CM

## 2022-01-06 DIAGNOSIS — E55.9 VITAMIN D DEFICIENCY: ICD-10-CM

## 2022-01-06 DIAGNOSIS — R76.8 ANTI-RNP ANTIBODIES PRESENT: ICD-10-CM

## 2022-01-06 DIAGNOSIS — M79.641 PAIN IN BOTH HANDS: ICD-10-CM

## 2022-01-06 DIAGNOSIS — M79.642 PAIN IN BOTH HANDS: ICD-10-CM

## 2022-01-06 DIAGNOSIS — M79.18 MYOFASCIAL PAIN: ICD-10-CM

## 2022-01-06 DIAGNOSIS — M54.2 NECK PAIN: ICD-10-CM

## 2022-01-06 PROCEDURE — 99215 OFFICE O/P EST HI 40 MIN: CPT | Mod: 95 | Performed by: INTERNAL MEDICINE

## 2022-01-06 RX ORDER — MULTIVITAMIN WITH IRON
1 TABLET ORAL DAILY
COMMUNITY
Start: 2021-02-22

## 2022-01-06 RX ORDER — POLYETHYLENE GLYCOL 3350 17 G/17G
POWDER, FOR SOLUTION ORAL
COMMUNITY
Start: 2021-05-25
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
## 2022-01-06 NOTE — PATIENT INSTRUCTIONS
Summary of Your Rheumatology Visit    Next Appointment:    Approximately 4 months, in clinic    Medications:      Referrals:      Tests:     Please have labs performed this month and a few days prior to next visit.    Injections:      Other:    If experiencing any symptoms of chest pain or shortness of breath or cough recommend discussing further with your primary care physician and correlating symptoms with echocardiogram findings from July 2021.

## 2022-02-04 ENCOUNTER — LAB (OUTPATIENT)
Dept: LAB | Facility: CLINIC | Age: 42
End: 2022-02-04
Payer: COMMERCIAL

## 2022-02-04 DIAGNOSIS — E55.9 VITAMIN D DEFICIENCY: ICD-10-CM

## 2022-02-04 DIAGNOSIS — R76.8 POSITIVE ANA (ANTINUCLEAR ANTIBODY): ICD-10-CM

## 2022-02-04 DIAGNOSIS — R76.8 ANTI-RNP ANTIBODIES PRESENT: ICD-10-CM

## 2022-02-04 DIAGNOSIS — M79.641 PAIN IN BOTH HANDS: ICD-10-CM

## 2022-02-04 DIAGNOSIS — M79.642 PAIN IN BOTH HANDS: ICD-10-CM

## 2022-02-04 LAB
ALBUMIN SERPL-MCNC: 3.5 G/DL (ref 3.5–5)
ALT SERPL W P-5'-P-CCNC: <9 U/L (ref 0–45)
AST SERPL W P-5'-P-CCNC: 11 U/L (ref 0–40)
BASOPHILS # BLD AUTO: 0 10E3/UL (ref 0–0.2)
BASOPHILS NFR BLD AUTO: 0 %
C REACTIVE PROTEIN LHE: 0.4 MG/DL (ref 0–0.8)
CALCIUM SERPL-MCNC: 8.9 MG/DL (ref 8.5–10.5)
CREAT SERPL-MCNC: 0.7 MG/DL (ref 0.6–1.1)
EOSINOPHIL # BLD AUTO: 0.1 10E3/UL (ref 0–0.7)
EOSINOPHIL NFR BLD AUTO: 1 %
ERYTHROCYTE [DISTWIDTH] IN BLOOD BY AUTOMATED COUNT: 15.3 % (ref 10–15)
ERYTHROCYTE [SEDIMENTATION RATE] IN BLOOD BY WESTERGREN METHOD: 24 MM/HR (ref 0–20)
GFR SERPL CREATININE-BSD FRML MDRD: >90 ML/MIN/1.73M2
HCT VFR BLD AUTO: 37.4 % (ref 35–47)
HGB BLD-MCNC: 12.3 G/DL (ref 11.7–15.7)
IMM GRANULOCYTES # BLD: 0 10E3/UL
IMM GRANULOCYTES NFR BLD: 0 %
LYMPHOCYTES # BLD AUTO: 1.8 10E3/UL (ref 0.8–5.3)
LYMPHOCYTES NFR BLD AUTO: 41 %
MCH RBC QN AUTO: 29.9 PG (ref 26.5–33)
MCHC RBC AUTO-ENTMCNC: 32.9 G/DL (ref 31.5–36.5)
MCV RBC AUTO: 91 FL (ref 78–100)
MONOCYTES # BLD AUTO: 0.4 10E3/UL (ref 0–1.3)
MONOCYTES NFR BLD AUTO: 8 %
NEUTROPHILS # BLD AUTO: 2.1 10E3/UL (ref 1.6–8.3)
NEUTROPHILS NFR BLD AUTO: 49 %
PLATELET # BLD AUTO: 143 10E3/UL (ref 150–450)
RBC # BLD AUTO: 4.11 10E6/UL (ref 3.8–5.2)
WBC # BLD AUTO: 4.3 10E3/UL (ref 4–11)

## 2022-02-04 PROCEDURE — 85652 RBC SED RATE AUTOMATED: CPT

## 2022-02-04 PROCEDURE — 82565 ASSAY OF CREATININE: CPT

## 2022-02-04 PROCEDURE — 86140 C-REACTIVE PROTEIN: CPT

## 2022-02-04 PROCEDURE — 82040 ASSAY OF SERUM ALBUMIN: CPT

## 2022-02-04 PROCEDURE — 82306 VITAMIN D 25 HYDROXY: CPT

## 2022-02-04 PROCEDURE — 84450 TRANSFERASE (AST) (SGOT): CPT

## 2022-02-04 PROCEDURE — 84460 ALANINE AMINO (ALT) (SGPT): CPT

## 2022-02-04 PROCEDURE — 85025 COMPLETE CBC W/AUTO DIFF WBC: CPT

## 2022-02-04 PROCEDURE — 82310 ASSAY OF CALCIUM: CPT

## 2022-02-04 PROCEDURE — 36415 COLL VENOUS BLD VENIPUNCTURE: CPT

## 2022-02-07 LAB — DEPRECATED CALCIDIOL+CALCIFEROL SERPL-MC: 16 UG/L (ref 30–80)

## 2022-02-15 DIAGNOSIS — D69.6 THROMBOCYTOPENIA (H): Primary | ICD-10-CM

## 2022-02-15 DIAGNOSIS — R79.89 LOW SERUM VITAMIN D: ICD-10-CM

## 2022-02-15 RX ORDER — ERGOCALCIFEROL 1.25 MG/1
50000 CAPSULE, LIQUID FILLED ORAL WEEKLY
Qty: 12 CAPSULE | Refills: 0 | Status: SHIPPED | OUTPATIENT
Start: 2022-02-15 | End: 2022-05-04

## 2022-03-10 ENCOUNTER — LAB (OUTPATIENT)
Dept: LAB | Facility: CLINIC | Age: 42
End: 2022-03-10
Payer: COMMERCIAL

## 2022-03-10 DIAGNOSIS — R76.8 ANTI-RNP ANTIBODIES PRESENT: ICD-10-CM

## 2022-03-10 DIAGNOSIS — M79.642 PAIN IN BOTH HANDS: ICD-10-CM

## 2022-03-10 DIAGNOSIS — R76.8 POSITIVE ANA (ANTINUCLEAR ANTIBODY): ICD-10-CM

## 2022-03-10 DIAGNOSIS — D69.6 THROMBOCYTOPENIA (H): ICD-10-CM

## 2022-03-10 DIAGNOSIS — M79.641 PAIN IN BOTH HANDS: ICD-10-CM

## 2022-03-10 LAB
ALBUMIN SERPL-MCNC: 3.4 G/DL (ref 3.5–5)
ALT SERPL W P-5'-P-CCNC: <9 U/L (ref 0–45)
AST SERPL W P-5'-P-CCNC: 11 U/L (ref 0–40)
BASOPHILS # BLD AUTO: 0 10E3/UL (ref 0–0.2)
BASOPHILS NFR BLD AUTO: 0 %
C REACTIVE PROTEIN LHE: 0.4 MG/DL (ref 0–0.8)
CREAT SERPL-MCNC: 0.72 MG/DL (ref 0.6–1.1)
EOSINOPHIL # BLD AUTO: 0.1 10E3/UL (ref 0–0.7)
EOSINOPHIL NFR BLD AUTO: 1 %
ERYTHROCYTE [DISTWIDTH] IN BLOOD BY AUTOMATED COUNT: 15.5 % (ref 10–15)
ERYTHROCYTE [SEDIMENTATION RATE] IN BLOOD BY WESTERGREN METHOD: 22 MM/HR (ref 0–20)
GFR SERPL CREATININE-BSD FRML MDRD: >90 ML/MIN/1.73M2
HCT VFR BLD AUTO: 37.9 % (ref 35–47)
HGB BLD-MCNC: 12.5 G/DL (ref 11.7–15.7)
IMM GRANULOCYTES # BLD: 0 10E3/UL
IMM GRANULOCYTES NFR BLD: 0 %
LYMPHOCYTES # BLD AUTO: 2 10E3/UL (ref 0.8–5.3)
LYMPHOCYTES NFR BLD AUTO: 35 %
MCH RBC QN AUTO: 30.2 PG (ref 26.5–33)
MCHC RBC AUTO-ENTMCNC: 33 G/DL (ref 31.5–36.5)
MCV RBC AUTO: 92 FL (ref 78–100)
MONOCYTES # BLD AUTO: 0.4 10E3/UL (ref 0–1.3)
MONOCYTES NFR BLD AUTO: 7 %
NEUTROPHILS # BLD AUTO: 3.2 10E3/UL (ref 1.6–8.3)
NEUTROPHILS NFR BLD AUTO: 57 %
PLATELET # BLD AUTO: 147 10E3/UL (ref 150–450)
RBC # BLD AUTO: 4.14 10E6/UL (ref 3.8–5.2)
WBC # BLD AUTO: 5.6 10E3/UL (ref 4–11)

## 2022-03-10 PROCEDURE — 82040 ASSAY OF SERUM ALBUMIN: CPT

## 2022-03-10 PROCEDURE — 84460 ALANINE AMINO (ALT) (SGPT): CPT

## 2022-03-10 PROCEDURE — 36415 COLL VENOUS BLD VENIPUNCTURE: CPT

## 2022-03-10 PROCEDURE — 84450 TRANSFERASE (AST) (SGOT): CPT

## 2022-03-10 PROCEDURE — 85025 COMPLETE CBC W/AUTO DIFF WBC: CPT

## 2022-03-10 PROCEDURE — 86140 C-REACTIVE PROTEIN: CPT

## 2022-03-10 PROCEDURE — 82565 ASSAY OF CREATININE: CPT

## 2022-03-10 PROCEDURE — 85652 RBC SED RATE AUTOMATED: CPT

## 2022-06-16 ENCOUNTER — VIRTUAL VISIT (OUTPATIENT)
Dept: RHEUMATOLOGY | Facility: CLINIC | Age: 42
End: 2022-06-16
Payer: COMMERCIAL

## 2022-06-16 DIAGNOSIS — M79.642 PAIN IN BOTH HANDS: Primary | ICD-10-CM

## 2022-06-16 DIAGNOSIS — R29.898 WEAKNESS OF BOTH HANDS: ICD-10-CM

## 2022-06-16 DIAGNOSIS — R76.8 ANTI-RNP ANTIBODIES PRESENT: ICD-10-CM

## 2022-06-16 DIAGNOSIS — G89.29 CHRONIC PAIN OF BOTH KNEES: ICD-10-CM

## 2022-06-16 DIAGNOSIS — M54.2 NECK PAIN: ICD-10-CM

## 2022-06-16 DIAGNOSIS — M25.561 CHRONIC PAIN OF BOTH KNEES: ICD-10-CM

## 2022-06-16 DIAGNOSIS — R79.89 LOW SERUM VITAMIN D: ICD-10-CM

## 2022-06-16 DIAGNOSIS — D69.6 THROMBOCYTOPENIA (H): ICD-10-CM

## 2022-06-16 DIAGNOSIS — R76.8 POSITIVE ANA (ANTINUCLEAR ANTIBODY): ICD-10-CM

## 2022-06-16 DIAGNOSIS — M25.562 CHRONIC PAIN OF BOTH KNEES: ICD-10-CM

## 2022-06-16 DIAGNOSIS — M79.18 MYOFASCIAL PAIN: ICD-10-CM

## 2022-06-16 DIAGNOSIS — M79.641 PAIN IN BOTH HANDS: Primary | ICD-10-CM

## 2022-06-16 PROCEDURE — 99215 OFFICE O/P EST HI 40 MIN: CPT | Mod: 95 | Performed by: INTERNAL MEDICINE

## 2022-06-16 RX ORDER — CELECOXIB 100 MG/1
CAPSULE ORAL
Qty: 75 CAPSULE | Refills: 3 | Status: SHIPPED | OUTPATIENT
Start: 2022-06-16

## 2022-06-16 NOTE — PATIENT INSTRUCTIONS
Summary of Your Rheumatology Visit    Next Appointment:   4 Months, preferable in clinic visit      Medications:    Please follow directives on pill bottle on how to take medication(s) provided.  Hold Aleve or Advil/ibuprofen while on Celebrex.  Referrals:      Tests:     Please have labs performed in about 3-4 weeks.      Injections:        Other:
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
## 2023-07-12 ENCOUNTER — HOSPITAL ENCOUNTER (EMERGENCY)
Facility: HOSPITAL | Age: 43
Discharge: HOME | End: 2023-07-13
Attending: EMERGENCY MEDICINE | Admitting: EMERGENCY MEDICINE
Payer: COMMERCIAL

## 2023-07-12 ENCOUNTER — APPOINTMENT (OUTPATIENT)
Dept: ULTRASOUND IMAGING | Facility: HOSPITAL | Age: 43
End: 2023-07-12
Attending: EMERGENCY MEDICINE
Payer: COMMERCIAL

## 2023-07-12 ENCOUNTER — APPOINTMENT (OUTPATIENT)
Dept: CT IMAGING | Facility: HOSPITAL | Age: 43
End: 2023-07-12
Attending: EMERGENCY MEDICINE
Payer: COMMERCIAL

## 2023-07-12 VITALS
HEIGHT: 66 IN | BODY MASS INDEX: 47.09 KG/M2 | HEART RATE: 67 BPM | RESPIRATION RATE: 16 BRPM | OXYGEN SATURATION: 97 % | DIASTOLIC BLOOD PRESSURE: 91 MMHG | SYSTOLIC BLOOD PRESSURE: 180 MMHG | WEIGHT: 293 LBS | TEMPERATURE: 97.8 F

## 2023-07-12 DIAGNOSIS — R10.84 GENERALIZED ABDOMINAL PAIN: ICD-10-CM

## 2023-07-12 LAB
ALBUMIN SERPL BCG-MCNC: 4.5 G/DL (ref 3.5–5.2)
ALBUMIN UR-MCNC: NEGATIVE MG/DL
ALP SERPL-CCNC: 74 U/L (ref 35–104)
ALT SERPL W P-5'-P-CCNC: 9 U/L (ref 0–50)
ANION GAP SERPL CALCULATED.3IONS-SCNC: 12 MMOL/L (ref 7–15)
APPEARANCE UR: CLEAR
AST SERPL W P-5'-P-CCNC: 20 U/L (ref 0–45)
BILIRUB DIRECT SERPL-MCNC: <0.2 MG/DL (ref 0–0.3)
BILIRUB SERPL-MCNC: 0.3 MG/DL
BILIRUB UR QL STRIP: NEGATIVE
BUN SERPL-MCNC: 7.2 MG/DL (ref 6–20)
CALCIUM SERPL-MCNC: 9.9 MG/DL (ref 8.6–10)
CHLORIDE SERPL-SCNC: 100 MMOL/L (ref 98–107)
COLOR UR AUTO: COLORLESS
CREAT SERPL-MCNC: 0.67 MG/DL (ref 0.51–0.95)
DEPRECATED HCO3 PLAS-SCNC: 26 MMOL/L (ref 22–29)
ERYTHROCYTE [DISTWIDTH] IN BLOOD BY AUTOMATED COUNT: 14 % (ref 10–15)
GFR SERPL CREATININE-BSD FRML MDRD: >90 ML/MIN/1.73M2
GLUCOSE SERPL-MCNC: 86 MG/DL (ref 70–99)
GLUCOSE UR STRIP-MCNC: NEGATIVE MG/DL
HCG UR QL: NEGATIVE
HCT VFR BLD AUTO: 42.7 % (ref 35–47)
HGB BLD-MCNC: 14.6 G/DL (ref 11.7–15.7)
HGB UR QL STRIP: ABNORMAL
HOLD SPECIMEN: NORMAL
KETONES UR STRIP-MCNC: NEGATIVE MG/DL
LEUKOCYTE ESTERASE UR QL STRIP: NEGATIVE
LIPASE SERPL-CCNC: 21 U/L (ref 13–60)
MCH RBC QN AUTO: 32.2 PG (ref 26.5–33)
MCHC RBC AUTO-ENTMCNC: 34.2 G/DL (ref 31.5–36.5)
MCV RBC AUTO: 94 FL (ref 78–100)
NITRATE UR QL: NEGATIVE
PH UR STRIP: 6 [PH] (ref 5–7)
PLATELET # BLD AUTO: 165 10E3/UL (ref 150–450)
POTASSIUM SERPL-SCNC: 3.7 MMOL/L (ref 3.4–5.3)
PROT SERPL-MCNC: 8 G/DL (ref 6.4–8.3)
RBC # BLD AUTO: 4.54 10E6/UL (ref 3.8–5.2)
RBC URINE: 1 /HPF
SODIUM SERPL-SCNC: 138 MMOL/L (ref 136–145)
SP GR UR STRIP: 1.01 (ref 1–1.03)
SQUAMOUS EPITHELIAL: 1 /HPF
UROBILINOGEN UR STRIP-MCNC: <2 MG/DL
WBC # BLD AUTO: 5.1 10E3/UL (ref 4–11)
WBC URINE: 1 /HPF

## 2023-07-12 PROCEDURE — 85014 HEMATOCRIT: CPT | Performed by: EMERGENCY MEDICINE

## 2023-07-12 PROCEDURE — 74177 CT ABD & PELVIS W/CONTRAST: CPT

## 2023-07-12 PROCEDURE — 36415 COLL VENOUS BLD VENIPUNCTURE: CPT | Performed by: EMERGENCY MEDICINE

## 2023-07-12 PROCEDURE — 82310 ASSAY OF CALCIUM: CPT | Performed by: EMERGENCY MEDICINE

## 2023-07-12 PROCEDURE — 81001 URINALYSIS AUTO W/SCOPE: CPT | Performed by: EMERGENCY MEDICINE

## 2023-07-12 PROCEDURE — 99285 EMERGENCY DEPT VISIT HI MDM: CPT | Mod: 25

## 2023-07-12 PROCEDURE — 82248 BILIRUBIN DIRECT: CPT | Performed by: EMERGENCY MEDICINE

## 2023-07-12 PROCEDURE — 250N000011 HC RX IP 250 OP 636: Performed by: EMERGENCY MEDICINE

## 2023-07-12 PROCEDURE — 76830 TRANSVAGINAL US NON-OB: CPT

## 2023-07-12 PROCEDURE — 83690 ASSAY OF LIPASE: CPT | Performed by: EMERGENCY MEDICINE

## 2023-07-12 PROCEDURE — 250N000011 HC RX IP 250 OP 636: Mod: JZ | Performed by: EMERGENCY MEDICINE

## 2023-07-12 PROCEDURE — 96374 THER/PROPH/DIAG INJ IV PUSH: CPT | Mod: 59

## 2023-07-12 PROCEDURE — 81025 URINE PREGNANCY TEST: CPT | Performed by: EMERGENCY MEDICINE

## 2023-07-12 PROCEDURE — 96375 TX/PRO/DX INJ NEW DRUG ADDON: CPT

## 2023-07-12 PROCEDURE — 80053 COMPREHEN METABOLIC PANEL: CPT | Performed by: EMERGENCY MEDICINE

## 2023-07-12 RX ORDER — MORPHINE SULFATE 4 MG/ML
4 INJECTION, SOLUTION INTRAMUSCULAR; INTRAVENOUS ONCE
Status: COMPLETED | OUTPATIENT
Start: 2023-07-12 | End: 2023-07-12

## 2023-07-12 RX ORDER — ONDANSETRON 2 MG/ML
4 INJECTION INTRAMUSCULAR; INTRAVENOUS ONCE
Status: COMPLETED | OUTPATIENT
Start: 2023-07-12 | End: 2023-07-12

## 2023-07-12 RX ORDER — IOPAMIDOL 755 MG/ML
100 INJECTION, SOLUTION INTRAVASCULAR ONCE
Status: COMPLETED | OUTPATIENT
Start: 2023-07-12 | End: 2023-07-12

## 2023-07-12 RX ADMIN — MORPHINE SULFATE 4 MG: 4 INJECTION, SOLUTION INTRAMUSCULAR; INTRAVENOUS at 21:27

## 2023-07-12 RX ADMIN — ONDANSETRON 4 MG: 2 INJECTION INTRAMUSCULAR; INTRAVENOUS at 21:24

## 2023-07-12 RX ADMIN — IOPAMIDOL 100 ML: 755 INJECTION, SOLUTION INTRAVENOUS at 22:52

## 2023-07-12 ASSESSMENT — ACTIVITIES OF DAILY LIVING (ADL)
ADLS_ACUITY_SCORE: 33
ADLS_ACUITY_SCORE: 35

## 2023-07-13 PROCEDURE — 250N000011 HC RX IP 250 OP 636: Mod: JZ | Performed by: EMERGENCY MEDICINE

## 2023-07-13 PROCEDURE — 96376 TX/PRO/DX INJ SAME DRUG ADON: CPT

## 2023-07-13 RX ORDER — ONDANSETRON 4 MG/1
4 TABLET, ORALLY DISINTEGRATING ORAL EVERY 8 HOURS PRN
Qty: 10 TABLET | Refills: 0 | Status: SHIPPED | OUTPATIENT
Start: 2023-07-13 | End: 2023-07-13

## 2023-07-13 RX ORDER — HYDROCODONE BITARTRATE AND ACETAMINOPHEN 5; 325 MG/1; MG/1
1 TABLET ORAL EVERY 6 HOURS PRN
Qty: 8 TABLET | Refills: 0 | Status: SHIPPED | OUTPATIENT
Start: 2023-07-13 | End: 2023-07-13

## 2023-07-13 RX ORDER — ONDANSETRON 2 MG/ML
4 INJECTION INTRAMUSCULAR; INTRAVENOUS ONCE
Status: COMPLETED | OUTPATIENT
Start: 2023-07-13 | End: 2023-07-13

## 2023-07-13 RX ORDER — ONDANSETRON 4 MG/1
4 TABLET, ORALLY DISINTEGRATING ORAL EVERY 8 HOURS PRN
Qty: 10 TABLET | Refills: 0 | Status: SHIPPED | OUTPATIENT
Start: 2023-07-13

## 2023-07-13 RX ORDER — KETOROLAC TROMETHAMINE 15 MG/ML
15 INJECTION, SOLUTION INTRAMUSCULAR; INTRAVENOUS ONCE
Status: COMPLETED | OUTPATIENT
Start: 2023-07-13 | End: 2023-07-13

## 2023-07-13 RX ORDER — HYDROCODONE BITARTRATE AND ACETAMINOPHEN 5; 325 MG/1; MG/1
1 TABLET ORAL EVERY 6 HOURS PRN
Qty: 8 TABLET | Refills: 0 | Status: SHIPPED | OUTPATIENT
Start: 2023-07-13

## 2023-07-13 RX ADMIN — ONDANSETRON 4 MG: 2 INJECTION INTRAMUSCULAR; INTRAVENOUS at 00:14

## 2023-07-13 RX ADMIN — KETOROLAC TROMETHAMINE 15 MG: 15 INJECTION, SOLUTION INTRAMUSCULAR; INTRAVENOUS at 00:14

## 2023-07-13 NOTE — ED TRIAGE NOTES
Patient developed mid abdominal pain yesterday that she describes as cramping. Today the pain has settled into RLQ, increases with movement/coughing. Denies emesis and diarrhea, some nausea noted.      Triage Assessment     Row Name 07/12/23 2034       Triage Assessment (Adult)    Airway WDL WDL       Respiratory WDL    Respiratory WDL WDL       Skin Circulation/Temperature WDL    Skin Circulation/Temperature WDL WDL       Cardiac WDL    Cardiac WDL WDL       Peripheral/Neurovascular WDL    Peripheral Neurovascular WDL WDL    Capillary Refill, General less than/equal to 3 secs       Cognitive/Neuro/Behavioral WDL    Cognitive/Neuro/Behavioral WDL WDL       [place] Coma Scale    Best Eye Response 4-->(E4) spontaneous    Best Motor Response 6-->(M6) obeys commands    Best Verbal Response 5-->(V5) oriented    [place] Coma Scale Score 15

## 2023-07-13 NOTE — ED PROVIDER NOTES
EMERGENCY DEPARTMENT ENCOUnter      NAME: Jannie Sanchez  AGE: 42 year old female  YOB: 1980  MRN: [id number]  EVALUATION DATE & TIME: 7/12/2023  8:46 PM    PCP: Kayli Rod    ED PROVIDER: Reza Washington DO      Chief Complaint   Patient presents with     Abdominal Pain         FINAL IMPRESSION:  1. Generalized abdominal pain          ED COURSE & MEDICAL DECISION MAKING:    The patient presented to the emergency department today with abdominal pain that was worse on the right side.  She had mild tenderness throughout this area on exam.  Laboratory testing today was unremarkable.  CT shows an ovarian cyst but no other acute findings.  A pelvic ultrasound was performed again showing signs of the cyst but no signs of torsion or other serious abnormalities.  Plan will be for discharge home with instructions for close outpatient primary care follow-up.  The patient is comfortable with this plan.    Medical Decision Making    History:    Supplemental history from: Documented in chart, if applicable    External Record(s) reviewed: Documented in chart, if applicable.    Work Up:    Chart documentation includes differential considered and any EKGs or imaging independently interpreted by provider, where specified.    In additional to work up documented, I considered the following work up: Documented in chart, if applicable.    External consultation:    Discussion of management with another provider: Documented in chart, if applicable    Complicating factors:    Care impacted by chronic illness: N/A    Care affected by social determinants of health: N/A    Disposition considerations: Discharge. I prescribed additional prescription strength medication(s) as charted. I considered admission, but discharged the patient after share decision making conversation.        At the conclusion of the encounter I discussed the results of all of the tests and the disposition. The questions were answered. The patient  or family acknowledged understanding and was agreeable with the care plan.         MEDICATIONS GIVEN IN THE EMERGENCY:  Medications   ondansetron (ZOFRAN) injection 4 mg (has no administration in time range)   ketorolac (TORADOL) injection 15 mg (has no administration in time range)   morphine (PF) injection 4 mg (4 mg Intravenous $Given 7/12/23 2127)   ondansetron (ZOFRAN) injection 4 mg (4 mg Intravenous $Given 7/12/23 2124)   iopamidol (ISOVUE-370) solution 100 mL (100 mLs Intravenous $Given 7/12/23 2252)       NEW PRESCRIPTIONS STARTED AT TODAY'S ER VISIT  New Prescriptions    HYDROCODONE-ACETAMINOPHEN (NORCO) 5-325 MG TABLET    Take 1 tablet by mouth every 6 hours as needed for pain    ONDANSETRON (ZOFRAN ODT) 4 MG ODT TAB    Take 1 tablet (4 mg) by mouth every 8 hours as needed for nausea          =================================================================    HPI        Jannie Sanchez is a 42 year old female who presents to the emergency department today complaining of mid abdominal pain.  She states that this feels crampy.  She states that it was initially in the middle of her abdomen and now is in the right lower quadrant.  She denies vomiting or diarrhea.  She has had some nausea.  She also notes some mild right flank pain.  No dysuria.  No other current complaints.          PAST MEDICAL HISTORY:  Past Medical History:   Diagnosis Date     Asthma        PAST SURGICAL HISTORY:  Past Surgical History:   Procedure Laterality Date     TUBAL LIGATION  2005           CURRENT MEDICATIONS:    HYDROcodone-acetaminophen (NORCO) 5-325 MG tablet  ondansetron (ZOFRAN ODT) 4 MG ODT tab  albuterol (PROVENTIL HFA) 90 mcg/actuation inhaler  albuterol (PROVENTIL) 2.5 mg /3 mL (0.083 %) nebulizer solution  benzonatate (TESSALON) 100 MG capsule  Calcium Carb-Cholecalciferol (CALCIUM + D3) 600-800 MG-UNIT TABS  celecoxib (CELEBREX) 100 MG capsule  DULERA 100-5 mcg/actuation HFAA inhaler  ergocalciferol  "(ERGOCALCIFEROL) 1,250 mcg (50,000 unit) capsule  Multiple Vitamins/Iron TABS  polyethylene glycol (MIRALAX) 17 GM/Dose powder  tiZANidine (ZANAFLEX) 2 MG tablet  tiZANidine (ZANAFLEX) 4 MG tablet        ALLERGIES:  Allergies   Allergen Reactions     Bee Venom Protein (Honey Bee) [Bee Venom] Unknown     Venom-Honey Bee [Bee Venom] Unknown       FAMILY HISTORY:  No family history on file.    SOCIAL HISTORY:   Social History     Socioeconomic History     Marital status: Single   Tobacco Use     Smoking status: Smoker, Current Status Unknown       VITALS:  Patient Vitals for the past 24 hrs:   BP Temp Temp src Pulse Resp SpO2 Height Weight   07/12/23 2300 -- -- -- 67 -- 97 % -- --   07/12/23 2225 (!) 180/91 -- -- 70 -- 99 % -- --   07/12/23 2215 -- -- -- 65 -- 99 % -- --   07/12/23 2212 -- -- -- 67 -- 99 % -- --   07/12/23 2145 -- -- -- 69 16 97 % -- --   07/12/23 2131 -- -- -- 65 -- 100 % -- --   07/12/23 2033 (!) 184/88 97.8  F (36.6  C) Oral 79 18 100 % 1.676 m (5' 6\") 136.1 kg (300 lb)       PHYSICAL EXAM    Constitutional:  Well developed, Well nourished,  HENT:  Normocephalic, Atraumatic, Oropharynx moist, Nose normal.   Eyes:  EOMI, Conjunctiva normal, No discharge.   Respiratory:  Normal breath sounds, No respiratory distress, No wheezing, No chest tenderness.   Cardiovascular:  Normal heart rate, Normal rhythm, No murmurs  GI:  Soft, mild right-sided abdominal tenderness, No guarding, No CVA tenderness.   Musculoskeletal:  No tenderness to palpation or major deformities noted.   Extremities: No lower extremity edema.  Neurologic:  Alert & oriented x 3, No focal deficits noted.   Psychiatric:  Affect normal, Judgment normal, Mood normal.        LAB:  All pertinent labs reviewed and interpreted.  Results for orders placed or performed during the hospital encounter of 07/12/23                         UA with Microscopic reflex to Culture    Specimen: Urine, Clean Catch   Result Value Ref Range    Color Urine " Colorless Colorless, Straw, Light Yellow, Yellow    Appearance Urine Clear Clear    Glucose Urine Negative Negative mg/dL    Bilirubin Urine Negative Negative    Ketones Urine Negative Negative mg/dL    Specific Gravity Urine 1.006 1.001 - 1.030    Blood Urine 0.03 mg/dL (A) Negative    pH Urine 6.0 5.0 - 7.0    Protein Albumin Urine Negative Negative mg/dL    Urobilinogen Urine <2.0 <2.0 mg/dL    Nitrite Urine Negative Negative    Leukocyte Esterase Urine Negative Negative    RBC Urine 1 <=2 /HPF    WBC Urine 1 <=5 /HPF    Squamous Epithelials Urine 1 <=1 /HPF   HCG qualitative urine   Result Value Ref Range    hCG Urine Qualitative Negative Negative   Extra Red Top Tube   Result Value Ref Range    Hold Specimen JIC    Extra Green Top (Lithium Heparin) Tube   Result Value Ref Range    Hold Specimen JIC    Extra Purple Top Tube   Result Value Ref Range    Hold Specimen JIC    CBC with platelets   Result Value Ref Range    WBC Count 5.1 4.0 - 11.0 10e3/uL    RBC Count 4.54 3.80 - 5.20 10e6/uL    Hemoglobin 14.6 11.7 - 15.7 g/dL    Hematocrit 42.7 35.0 - 47.0 %    MCV 94 78 - 100 fL    MCH 32.2 26.5 - 33.0 pg    MCHC 34.2 31.5 - 36.5 g/dL    RDW 14.0 10.0 - 15.0 %    Platelet Count 165 150 - 450 10e3/uL   Basic metabolic panel   Result Value Ref Range    Sodium 138 136 - 145 mmol/L    Potassium 3.7 3.4 - 5.3 mmol/L    Chloride 100 98 - 107 mmol/L    Carbon Dioxide (CO2) 26 22 - 29 mmol/L    Anion Gap 12 7 - 15 mmol/L    Urea Nitrogen 7.2 6.0 - 20.0 mg/dL    Creatinine 0.67 0.51 - 0.95 mg/dL    Calcium 9.9 8.6 - 10.0 mg/dL    Glucose 86 70 - 99 mg/dL    GFR Estimate >90 >60 mL/min/1.73m2   Hepatic function panel   Result Value Ref Range    Protein Total 8.0 6.4 - 8.3 g/dL    Albumin 4.5 3.5 - 5.2 g/dL    Bilirubin Total 0.3 <=1.2 mg/dL    Alkaline Phosphatase 74 35 - 104 U/L    AST 20 0 - 45 U/L    ALT 9 0 - 50 U/L    Bilirubin Direct <0.20 0.00 - 0.30 mg/dL   Result Value Ref Range    Lipase 21 13 - 60 U/L        RADIOLOGY:  I have independently reviewed and interpreted the above imaging, pending the final radiology read.  US Pelvis Cmplt w Transvag & Doppler LmtPel Duplex Limited   Final Result   IMPRESSION:     1.  Left ovarian 2.7 cm hemorrhagic cyst. Consider follow-up per reference below.      2.  Ovaries otherwise appear normal. No evidence of ovarian torsion.      3.  Right uterine subserosal fibroid measuring 1.5 cm.         HEMORRHAGIC CYSTS:   Premenopausal women/early post-menopausal (<5 years from LMP):   <=5 cm: no follow up needed.    >5 cm: follow up in 6-12 weeks to ensure resolution      Late post-menopausal women (>5 years from LMP):   Any size should be considered neoplastic and surgical evaluation should be considered.      REFERENCE:   Management of Asymptomatic Ovarian and Other Adnexal Cysts Imaged at US: Society of Radiologists in Ultrasound Consensus Conference Statement. Radiology September 2010; [phone].      Simple Adnexal Cysts: SRU Consensus Conference Update on Follow-up and Reporting. Radiology September 2019. [phone].      CT Abdomen Pelvis w Contrast   Final Result   IMPRESSION:    1.  No acute process.   2.  Left ovarian cyst.   3.  Diverticulosis.            Reza Washington DO  Emergency Medicine  [facility] EMERGENCY DEPARTMENT  [address] [facility] [id number] [phone]  Dept: [phone]     Reza Washington MD  07/13/23 0006

## 2023-07-13 NOTE — ED NOTES
Rx from yesterday's visit with Dr. Washington were not received by pharmacy.  I'm printing new ones for pt to .     Reza Lopez MD  07/13/23 2835

## 2023-07-13 NOTE — DISCHARGE INSTRUCTIONS
Fortunately all of your testing today has been normal.  Take the prescribed medications as directed and follow-up closely with your primary care doctor.  Return to the emergency department for any worsening symptoms or other concerns.

## 2025-01-28 ENCOUNTER — LAB REQUISITION (OUTPATIENT)
Dept: LAB | Facility: CLINIC | Age: 45
End: 2025-01-28

## 2025-01-28 DIAGNOSIS — Z13.220 ENCOUNTER FOR SCREENING FOR LIPOID DISORDERS: ICD-10-CM

## 2025-01-28 DIAGNOSIS — R23.2 FLUSHING: ICD-10-CM

## 2025-01-28 DIAGNOSIS — R35.0 FREQUENCY OF MICTURITION: ICD-10-CM

## 2025-01-28 LAB
ALBUMIN UR-MCNC: 10 MG/DL
APPEARANCE UR: CLEAR
BILIRUB UR QL STRIP: NEGATIVE
COLOR UR AUTO: YELLOW
GLUCOSE UR STRIP-MCNC: NEGATIVE MG/DL
HGB UR QL STRIP: NEGATIVE
HYALINE CASTS: 4 /LPF
KETONES UR STRIP-MCNC: NEGATIVE MG/DL
LEUKOCYTE ESTERASE UR QL STRIP: ABNORMAL
MUCOUS THREADS #/AREA URNS LPF: PRESENT /LPF
NITRATE UR QL: NEGATIVE
PH UR STRIP: 6 [PH] (ref 5–7)
RBC URINE: 2 /HPF
SP GR UR STRIP: 1.03 (ref 1–1.03)
SQUAMOUS EPITHELIAL: 5 /HPF
UROBILINOGEN UR STRIP-MCNC: 3 MG/DL
WBC URINE: 3 /HPF

## 2025-01-29 LAB
ALBUMIN SERPL BCG-MCNC: 3.9 G/DL (ref 3.5–5.2)
ALP SERPL-CCNC: 97 U/L (ref 40–150)
ALT SERPL W P-5'-P-CCNC: 10 U/L (ref 0–50)
ANION GAP SERPL CALCULATED.3IONS-SCNC: 8 MMOL/L (ref 7–15)
AST SERPL W P-5'-P-CCNC: 21 U/L (ref 0–45)
BILIRUB SERPL-MCNC: 0.3 MG/DL
BUN SERPL-MCNC: 12.5 MG/DL (ref 6–20)
CALCIUM SERPL-MCNC: 9 MG/DL (ref 8.8–10.4)
CHLORIDE SERPL-SCNC: 102 MMOL/L (ref 98–107)
CHOLEST SERPL-MCNC: 217 MG/DL
CREAT SERPL-MCNC: 0.71 MG/DL (ref 0.51–0.95)
EGFRCR SERPLBLD CKD-EPI 2021: >90 ML/MIN/1.73M2
FASTING STATUS PATIENT QL REPORTED: ABNORMAL
FASTING STATUS PATIENT QL REPORTED: NORMAL
GLUCOSE SERPL-MCNC: 90 MG/DL (ref 70–99)
HCO3 SERPL-SCNC: 27 MMOL/L (ref 22–29)
HDLC SERPL-MCNC: 37 MG/DL
LDLC SERPL CALC-MCNC: 126 MG/DL
NONHDLC SERPL-MCNC: 180 MG/DL
POTASSIUM SERPL-SCNC: 4 MMOL/L (ref 3.4–5.3)
PROT SERPL-MCNC: 7.4 G/DL (ref 6.4–8.3)
SODIUM SERPL-SCNC: 137 MMOL/L (ref 135–145)
TRIGL SERPL-MCNC: 271 MG/DL
TSH SERPL DL<=0.005 MIU/L-ACNC: 0.44 UIU/ML (ref 0.3–4.2)

## 2025-01-30 ENCOUNTER — HOSPITAL ENCOUNTER (OUTPATIENT)
Facility: CLINIC | Age: 45
End: 2025-01-30
Attending: INTERNAL MEDICINE | Admitting: INTERNAL MEDICINE
Payer: COMMERCIAL

## 2025-02-05 ENCOUNTER — LAB REQUISITION (OUTPATIENT)
Dept: LAB | Facility: CLINIC | Age: 45
End: 2025-02-05
Payer: COMMERCIAL

## 2025-02-05 DIAGNOSIS — Z12.4 ENCOUNTER FOR SCREENING FOR MALIGNANT NEOPLASM OF CERVIX: ICD-10-CM

## 2025-02-05 DIAGNOSIS — R35.0 FREQUENCY OF MICTURITION: ICD-10-CM

## 2025-02-05 PROCEDURE — G0145 SCR C/V CYTO,THINLAYER,RESCR: HCPCS | Mod: ORL | Performed by: OBSTETRICS & GYNECOLOGY

## 2025-02-05 PROCEDURE — 87624 HPV HI-RISK TYP POOLED RSLT: CPT | Mod: ORL | Performed by: OBSTETRICS & GYNECOLOGY

## 2025-02-05 PROCEDURE — 87088 URINE BACTERIA CULTURE: CPT | Mod: ORL | Performed by: OBSTETRICS & GYNECOLOGY

## 2025-02-06 LAB
BACTERIA UR CULT: ABNORMAL
BACTERIA UR CULT: ABNORMAL
HPV HR 12 DNA CVX QL NAA+PROBE: NEGATIVE
HPV16 DNA CVX QL NAA+PROBE: NEGATIVE
HPV18 DNA CVX QL NAA+PROBE: NEGATIVE
HUMAN PAPILLOMA VIRUS FINAL DIAGNOSIS: NORMAL

## 2025-02-10 LAB
BKR AP ASSOCIATED HPV REPORT: NORMAL
BKR LAB AP GYN ADEQUACY: NORMAL
BKR LAB AP GYN INTERPRETATION: NORMAL
BKR LAB AP LMP: NORMAL
BKR LAB AP PREVIOUS ABNL DX: NORMAL
BKR LAB AP PREVIOUS ABNORMAL: NORMAL
PATH REPORT.COMMENTS IMP SPEC: NORMAL
PATH REPORT.COMMENTS IMP SPEC: NORMAL
PATH REPORT.RELEVANT HX SPEC: NORMAL

## 2025-02-13 ENCOUNTER — LAB REQUISITION (OUTPATIENT)
Dept: LAB | Facility: CLINIC | Age: 45
End: 2025-02-13
Payer: COMMERCIAL

## 2025-02-13 DIAGNOSIS — Z13.220 ENCOUNTER FOR SCREENING FOR LIPOID DISORDERS: ICD-10-CM

## 2025-02-13 DIAGNOSIS — Z13.29 ENCOUNTER FOR SCREENING FOR OTHER SUSPECTED ENDOCRINE DISORDER: ICD-10-CM

## 2025-02-13 DIAGNOSIS — Z13.228 ENCOUNTER FOR SCREENING FOR OTHER METABOLIC DISORDERS: ICD-10-CM

## 2025-02-13 DIAGNOSIS — Z13.1 ENCOUNTER FOR SCREENING FOR DIABETES MELLITUS: ICD-10-CM

## 2025-02-13 LAB
EST. AVERAGE GLUCOSE BLD GHB EST-MCNC: 114 MG/DL
HBA1C MFR BLD: 5.6 %

## 2025-02-13 PROCEDURE — 84443 ASSAY THYROID STIM HORMONE: CPT | Mod: ORL | Performed by: OBSTETRICS & GYNECOLOGY

## 2025-02-13 PROCEDURE — 80061 LIPID PANEL: CPT | Mod: ORL | Performed by: OBSTETRICS & GYNECOLOGY

## 2025-02-13 PROCEDURE — 83036 HEMOGLOBIN GLYCOSYLATED A1C: CPT | Mod: ORL | Performed by: OBSTETRICS & GYNECOLOGY

## 2025-02-13 PROCEDURE — 80053 COMPREHEN METABOLIC PANEL: CPT | Mod: ORL | Performed by: OBSTETRICS & GYNECOLOGY

## 2025-02-14 LAB
ALBUMIN SERPL BCG-MCNC: 4.1 G/DL (ref 3.5–5.2)
ALP SERPL-CCNC: 84 U/L (ref 40–150)
ALT SERPL W P-5'-P-CCNC: 10 U/L (ref 0–50)
ANION GAP SERPL CALCULATED.3IONS-SCNC: 11 MMOL/L (ref 7–15)
AST SERPL W P-5'-P-CCNC: 15 U/L (ref 0–45)
BILIRUB SERPL-MCNC: 0.3 MG/DL
BUN SERPL-MCNC: 13.4 MG/DL (ref 6–20)
CALCIUM SERPL-MCNC: 9.3 MG/DL (ref 8.8–10.4)
CHLORIDE SERPL-SCNC: 103 MMOL/L (ref 98–107)
CHOLEST SERPL-MCNC: 230 MG/DL
CREAT SERPL-MCNC: 0.88 MG/DL (ref 0.51–0.95)
EGFRCR SERPLBLD CKD-EPI 2021: 82 ML/MIN/1.73M2
FASTING STATUS PATIENT QL REPORTED: NO
FASTING STATUS PATIENT QL REPORTED: NO
GLUCOSE SERPL-MCNC: 85 MG/DL (ref 70–99)
HCO3 SERPL-SCNC: 24 MMOL/L (ref 22–29)
HDLC SERPL-MCNC: 36 MG/DL
LDLC SERPL CALC-MCNC: 156 MG/DL
NONHDLC SERPL-MCNC: 194 MG/DL
POTASSIUM SERPL-SCNC: 4.7 MMOL/L (ref 3.4–5.3)
PROT SERPL-MCNC: 7.8 G/DL (ref 6.4–8.3)
SODIUM SERPL-SCNC: 138 MMOL/L (ref 135–145)
TRIGL SERPL-MCNC: 192 MG/DL
TSH SERPL DL<=0.005 MIU/L-ACNC: 0.51 UIU/ML (ref 0.3–4.2)

## 2025-06-19 ENCOUNTER — HOSPITAL ENCOUNTER (EMERGENCY)
Facility: CLINIC | Age: 45
Discharge: HOME | End: 2025-06-19
Payer: COMMERCIAL

## 2025-06-19 VITALS
HEIGHT: 66 IN | TEMPERATURE: 97.8 F | HEART RATE: 74 BPM | DIASTOLIC BLOOD PRESSURE: 128 MMHG | SYSTOLIC BLOOD PRESSURE: 237 MMHG | RESPIRATION RATE: 20 BRPM | WEIGHT: 285 LBS | OXYGEN SATURATION: 100 % | BODY MASS INDEX: 45.8 KG/M2

## 2025-06-19 DIAGNOSIS — K08.89 PAIN, DENTAL: ICD-10-CM

## 2025-06-19 PROCEDURE — 250N000013 HC RX MED GY IP 250 OP 250 PS 637

## 2025-06-19 PROCEDURE — 250N000011 HC RX IP 250 OP 636

## 2025-06-19 PROCEDURE — 96372 THER/PROPH/DIAG INJ SC/IM: CPT

## 2025-06-19 PROCEDURE — 99284 EMERGENCY DEPT VISIT MOD MDM: CPT

## 2025-06-19 RX ORDER — KETOROLAC TROMETHAMINE 15 MG/ML
15 INJECTION, SOLUTION INTRAMUSCULAR; INTRAVENOUS ONCE
Status: COMPLETED | OUTPATIENT
Start: 2025-06-19 | End: 2025-06-19

## 2025-06-19 RX ORDER — LIDOCAINE HYDROCHLORIDE 20 MG/ML
15 SOLUTION OROPHARYNGEAL
Qty: 200 ML | Refills: 0 | Status: SHIPPED | OUTPATIENT
Start: 2025-06-19

## 2025-06-19 RX ORDER — OXYCODONE HYDROCHLORIDE 5 MG/1
5 TABLET ORAL ONCE
Refills: 0 | Status: COMPLETED | OUTPATIENT
Start: 2025-06-19 | End: 2025-06-19

## 2025-06-19 RX ADMIN — KETOROLAC TROMETHAMINE 15 MG: 15 INJECTION, SOLUTION INTRAMUSCULAR; INTRAVENOUS at 19:08

## 2025-06-19 RX ADMIN — AMOXICILLIN AND CLAVULANATE POTASSIUM 1 TABLET: 875; 125 TABLET, FILM COATED ORAL at 19:08

## 2025-06-19 RX ADMIN — OXYCODONE HYDROCHLORIDE 5 MG: 5 TABLET ORAL at 19:08

## 2025-06-19 ASSESSMENT — COLUMBIA-SUICIDE SEVERITY RATING SCALE - C-SSRS
6. HAVE YOU EVER DONE ANYTHING, STARTED TO DO ANYTHING, OR PREPARED TO DO ANYTHING TO END YOUR LIFE?: NO
1. IN THE PAST MONTH, HAVE YOU WISHED YOU WERE DEAD OR WISHED YOU COULD GO TO SLEEP AND NOT WAKE UP?: NO
2. HAVE YOU ACTUALLY HAD ANY THOUGHTS OF KILLING YOURSELF IN THE PAST MONTH?: NO

## 2025-06-19 ASSESSMENT — ACTIVITIES OF DAILY LIVING (ADL): ADLS_ACUITY_SCORE: 41

## 2025-06-19 NOTE — ED PROVIDER NOTES
Emergency Department Encounter   NAME: Jannie Sanchez ; AGE: 44 year old female ; YOB: 1980 ; MRN: [id number] ; PCP: Kayli Rod   ED PROVIDER: Wendy Perera PA-C    Evaluation Date & Time:   No admission date for patient encounter.    CHIEF COMPLAINT:  Dental Pain      Impression and Plan   MDM: Jannie Sanchez is a 44 year old female who presents to the ED for evaluation of dental pain.  Patient states she has bilateral mid left-sided dental pain that began 2 days ago, noticed some swelling this morning.  Was seen at [facility] earlier this afternoon and had a dental block completed, and states only her tongue is numb but it did not actually help with her dental pain.  Has been using Tylenol and ibuprofen at home without relief.  Has a dental appointment scheduled for next week but is unable to get in before then.  Denies any difficulty swallowing, eating or drinking, respiratory distress..     Physical exam is significant for notable swelling to the left cheek under the patient's chin but no area of fluctuance consistent with an abscess.  There is tenderness to palpation of the mucosal aspect of the cheek along the gingiva of the left bottom teeth, no notable abscess.  Mucous membranes are moist, uvula is not swollen.  Airway is not compromised.  Heart lung sounds are clear to auscultation.  There is no swelling under the tongue or skin changes consistent with Leonidas angina.    Consider labs and imaging but at this time do not feel like they are indicated.  Since she had a referral placed earlier today and she is still waiting for the effects of this in her tongue, although it was misplaced, I do feel it is too early to repeat this here today.  Given this, I do think a dose of oxycodone here in the emergency department is appropriate.  For the first dose of her antibiotics as well.  We discussed other options for pain control and will give viscous lidocaine as well.  Patient is agreeable  with this.,  Tylenol and ibuprofen, antibiotics at home.  Strict return precautions discussed.  Stable for discharge.    Return precautions to the ED were discussed, patient verbalized understanding and were agreeable with the plan. All questions were answered.     Per chart review:  -Seen earlier today at [facility]  - Recent labs and imaging reviewed  - Care everywhere reviewed    Medical Decision Making      Discharge. I prescribed additional prescription strength medication(s) as charted. N/A.    MIPS (CTPE, Dental pain, Serna, Sinusitis, Asthma/COPD, Head Trauma):     SEPSIS: None        ED COURSE:  6:44 PM I met and introduced myself to the patient. I gathered initial history and performed my physical exam. We discussed plan for initial workup.   7:04 PM  I rechecked the patient and discussed results, discharge, follow up, and reasons to return to the ED.       FINAL IMPRESSION:    ICD-10-CM    1. Pain, dental  K08.89             MEDICATIONS GIVEN IN THE EMERGENCY DEPARTMENT:  Medications   ketorolac (TORADOL) injection 15 mg (has no administration in time range)   oxyCODONE (ROXICODONE) tablet 5 mg (has no administration in time range)   amoxicillin-clavulanate (AUGMENTIN) 875-125 MG per tablet 1 tablet (has no administration in time range)         NEW PRESCRIPTIONS STARTED AT TODAY'S ED VISIT:  New Prescriptions    AMOXICILLIN-CLAVULANATE (AUGMENTIN) 875-125 MG TABLET    Take 1 tablet by mouth 2 times daily for 7 days.    LIDOCAINE, VISCOUS, (XYLOCAINE) 2 % SOLUTION    Swish and swallow 15 mLs in mouth every 3 hours as needed for pain. ; Max 8 doses/24 hour period.         HPI   Use of Intrepreter: N/A     Jannielillian Sanchez is a 44 year old female with a pertinent history of dental pain who presents to the ED by walk in for evaluation of dental pain.  Patient states she has bilateral mid left-sided dental pain that began 2 days ago, noticed some swelling this morning.  Was seen at [facility] earlier this afternoon  "and had a dental block completed, and states only her tongue is numb but it did not actually help with her dental pain.  Has been using Tylenol and ibuprofen at home without relief.  Has a dental appointment scheduled for next week but is unable to get in before then.  Denies any difficulty swallowing, eating or drinking, respiratory distress...       REVIEW OF SYSTEMS:  Pertinent positive and negative symptoms per HPI.       Medical History     Past Medical History:   Diagnosis Date    Asthma        Past Surgical History:   Procedure Laterality Date    COLONOSCOPY      TUBAL LIGATION  01/01/2005       No family history on file.    Social History     Tobacco Use    Smoking status: Every Day     Current packs/day: 0.50     Types: Cigarettes    Smokeless tobacco: Never   Vaping Use    Vaping status: Never Used   Substance Use Topics    Alcohol use: Yes     Comment: on weekends    Drug use: Yes     Types: Marijuana       amoxicillin-clavulanate (AUGMENTIN) 875-125 MG tablet  lidocaine, viscous, (XYLOCAINE) 2 % solution  albuterol (PROVENTIL HFA) 90 mcg/actuation inhaler  albuterol (PROVENTIL) 2.5 mg /3 mL (0.083 %) nebulizer solution  celecoxib (CELEBREX) 100 MG capsule  DULERA 100-5 mcg/actuation HFAA inhaler  Multiple Vitamins/Iron TABS  polyethylene glycol (MIRALAX) 17 GM/Dose powder  tiZANidine (ZANAFLEX) 2 MG tablet  tiZANidine (ZANAFLEX) 4 MG tablet          Physical Exam     First Vitals:  Patient Vitals for the past 24 hrs:   BP Temp Temp src Pulse Resp SpO2 Height Weight   06/19/25 1730 (!) 237/128 97.8  F (36.6  C) Temporal 74 20 100 % 1.676 m (5' 6\") 129.3 kg (285 lb)         PHYSICAL EXAM:   Physical Exam  Constitutional:       General: She is not in acute distress.     Appearance: She is well-developed. She is not ill-appearing.   HENT:      Head: Normocephalic.      Comments: Left-sided cheek swelling and swelling underneath the left side of the chin.     Right Ear: External ear normal.      Left Ear: " External ear normal.      Nose: Nose normal. No congestion.      Mouth/Throat:      Mouth: Mucous membranes are moist.      Dentition: Dental tenderness and gingival swelling present. No dental abscesses.      Tongue: No lesions.      Palate: No mass.      Pharynx: Uvula midline. No uvula swelling.      Comments: Erythematous gingiva on the left lower teeth, tenderness to palpation of the left cheek.  No notable abscess.  Eyes:      Conjunctiva/sclera: Conjunctivae normal.   Cardiovascular:      Rate and Rhythm: Normal rate and regular rhythm.      Heart sounds: Normal heart sounds.   Pulmonary:      Effort: Pulmonary effort is normal. No respiratory distress.      Breath sounds: Normal breath sounds. No wheezing or rales.   Abdominal:      General: Abdomen is flat.      Palpations: There is no mass.      Tenderness: There is no abdominal tenderness. There is no guarding.   Musculoskeletal:      Cervical back: Normal range of motion. No rigidity or tenderness.   Lymphadenopathy:      Cervical: No cervical adenopathy.   Skin:     General: Skin is warm.      Capillary Refill: Capillary refill takes less than 2 seconds.   Neurological:      General: No focal deficit present.      Mental Status: She is alert and oriented to person, place, and time.   Psychiatric:         Mood and Affect: Mood normal.         Behavior: Behavior normal.             Results     LAB:  All pertinent labs reviewed and interpreted  Labs Ordered and Resulted from Time of ED Arrival to Time of ED Departure - No data to display    RADIOLOGY:  No orders to display         ECG:  None    PROCEDURES:  None      Wendy Perera PA-C   Emergency Medicine   [facility] EMERGENCY ROOM       Wendy Perera PA-C  06/19/25 [id number]

## 2025-06-19 NOTE — DISCHARGE INSTRUCTIONS
Pain Management:   -You may take Tylenol 1000mg every 6 hours as needed, do not take more than 4000mg (4g) in 24 hours.  -You may take ibuprofen 800mg every 8 hours as needed.   - Viscous lidocaine as needed  - Ice to the jaw  - Drink lots of cold fluids  - Start your antibiotics in the morning    Return if worsening swelling, airway compromise, difficulty swallowing.

## 2025-06-19 NOTE — ED TRIAGE NOTES
Pt c/o L lower dental pain and L facial swelling x2 days. States swelling and pain extends into her neck. Was seen at [facility] ED earlier today and gave a nerve block but pt states it numbed her tongue and not the tooth. Also started on abx there. Attempted to make an emergency appt with her dentist but told she couldn't get in for a week.     Triage Assessment (Adult)       Row Name 06/19/25 5692          Triage Assessment    Airway WDL WDL        Respiratory WDL    Respiratory WDL WDL        Skin Circulation/Temperature WDL    Skin Circulation/Temperature WDL WDL        Cardiac WDL    Cardiac WDL WDL        Peripheral/Neurovascular WDL    Peripheral Neurovascular WDL WDL        Cognitive/Neuro/Behavioral WDL    Cognitive/Neuro/Behavioral WDL WDL        Susannah Coma Scale    Best Eye Response 4-->(E4) spontaneous     Best Motor Response 6-->(M6) obeys commands     Best Verbal Response 5-->(V5) oriented     Susannah Coma Scale Score 15